# Patient Record
Sex: MALE | Race: WHITE | NOT HISPANIC OR LATINO | Employment: OTHER | URBAN - METROPOLITAN AREA
[De-identification: names, ages, dates, MRNs, and addresses within clinical notes are randomized per-mention and may not be internally consistent; named-entity substitution may affect disease eponyms.]

---

## 2017-06-20 ENCOUNTER — TELEPHONE (OUTPATIENT)
Dept: FAMILY MEDICINE | Facility: CLINIC | Age: 67
End: 2017-06-20

## 2017-06-20 DIAGNOSIS — B00.1 RECURRENT COLD SORES: Primary | ICD-10-CM

## 2017-06-20 RX ORDER — VALACYCLOVIR HYDROCHLORIDE 1 G/1
2000 TABLET, FILM COATED ORAL 2 TIMES DAILY
Qty: 8 TABLET | Refills: 0 | Status: SHIPPED | OUTPATIENT
Start: 2017-06-20 | End: 2017-08-22

## 2017-06-20 NOTE — TELEPHONE ENCOUNTER
Patient has hx of cold sores, getting quiet often. Requesting prescription. Has tried several over the counter treatments with no relief. Had a cold sore a few weeks ago, and has one today.  To provider to advise. .Pearl ARREOLA, RN, CPN

## 2017-06-20 NOTE — TELEPHONE ENCOUNTER
Reason for Call:  Medication or medication refill:    Do you use a [facility]?  Name of the pharmacy and phone number for the current request:  [facility] [phone]    Name of the medication requested: Something for a Core sore    Other request:     Can we leave a detailed message on this number? YES    Phone number patient can be reached at: Home number on file [phone] (home)    Best Time: anytime    Call taken on 6/20/2017 at 10:18 AM by Laurent Musa

## 2017-08-22 ENCOUNTER — TELEPHONE (OUTPATIENT)
Dept: FAMILY MEDICINE | Facility: CLINIC | Age: 67
End: 2017-08-22

## 2017-08-22 DIAGNOSIS — B00.1 RECURRENT COLD SORES: ICD-10-CM

## 2017-08-22 RX ORDER — VALACYCLOVIR HYDROCHLORIDE 1 G/1
2000 TABLET, FILM COATED ORAL 2 TIMES DAILY
Qty: 8 TABLET | Refills: 0 | Status: SHIPPED | OUTPATIENT
Start: 2017-08-22 | End: 2017-09-21

## 2017-08-22 NOTE — TELEPHONE ENCOUNTER
Reason for Call:  Medication or medication refill:    Do you use a [facility]?  Name of the pharmacy and phone number for the current request:  [facility] [phone]    Name of the medication requested: valACYclovir (VALTREX) 1000 mg tablet    Other request:     Can we leave a detailed message on this number? YES    Phone number patient can be reached at: Home number on file [phone] (home)    Best Time:     Call taken on 8/22/2017 at 8:43 AM by Africa Nicholson

## 2017-09-13 ENCOUNTER — DOCUMENTATION ONLY (OUTPATIENT)
Dept: LAB | Facility: CLINIC | Age: 67
End: 2017-09-13

## 2017-09-13 DIAGNOSIS — I10 HYPERTENSION GOAL BP (BLOOD PRESSURE) < 140/90: ICD-10-CM

## 2017-09-13 DIAGNOSIS — E78.5 HYPERLIPIDEMIA WITH TARGET LDL LESS THAN 130: Primary | ICD-10-CM

## 2017-09-13 DIAGNOSIS — Z12.5 SPECIAL SCREENING FOR MALIGNANT NEOPLASM OF PROSTATE: ICD-10-CM

## 2017-09-13 DIAGNOSIS — Z00.00 ROUTINE GENERAL MEDICAL EXAMINATION AT A HEALTH CARE FACILITY: ICD-10-CM

## 2017-09-19 DIAGNOSIS — I10 HYPERTENSION GOAL BP (BLOOD PRESSURE) < 140/90: ICD-10-CM

## 2017-09-19 DIAGNOSIS — Z12.5 SPECIAL SCREENING FOR MALIGNANT NEOPLASM OF PROSTATE: ICD-10-CM

## 2017-09-19 DIAGNOSIS — E78.5 HYPERLIPIDEMIA WITH TARGET LDL LESS THAN 130: ICD-10-CM

## 2017-09-19 DIAGNOSIS — Z00.00 ROUTINE GENERAL MEDICAL EXAMINATION AT A HEALTH CARE FACILITY: ICD-10-CM

## 2017-09-19 LAB
ALBUMIN SERPL-MCNC: 4.1 G/DL (ref 3.4–5)
ALBUMIN UR-MCNC: NEGATIVE MG/DL
ALP SERPL-CCNC: 62 U/L (ref 40–150)
ALT SERPL W P-5'-P-CCNC: 29 U/L (ref 0–70)
ANION GAP SERPL CALCULATED.3IONS-SCNC: 5 MMOL/L (ref 3–14)
APPEARANCE UR: CLEAR
AST SERPL W P-5'-P-CCNC: 15 U/L (ref 0–45)
BILIRUB SERPL-MCNC: 0.7 MG/DL (ref 0.2–1.3)
BILIRUB UR QL STRIP: NEGATIVE
BUN SERPL-MCNC: 13 MG/DL (ref 7–30)
CALCIUM SERPL-MCNC: 9.1 MG/DL (ref 8.5–10.1)
CHLORIDE SERPL-SCNC: 103 MMOL/L (ref 94–109)
CHOLEST SERPL-MCNC: 215 MG/DL
CO2 SERPL-SCNC: 30 MMOL/L (ref 20–32)
COLOR UR AUTO: YELLOW
CREAT SERPL-MCNC: 1.1 MG/DL (ref 0.66–1.25)
GFR SERPL CREATININE-BSD FRML MDRD: 67 ML/MIN/1.7M2
GLUCOSE SERPL-MCNC: 95 MG/DL (ref 70–99)
GLUCOSE UR STRIP-MCNC: NEGATIVE MG/DL
HDLC SERPL-MCNC: 78 MG/DL
HGB UR QL STRIP: ABNORMAL
KETONES UR STRIP-MCNC: NEGATIVE MG/DL
LDLC SERPL CALC-MCNC: 107 MG/DL
LEUKOCYTE ESTERASE UR QL STRIP: NEGATIVE
NITRATE UR QL: NEGATIVE
NONHDLC SERPL-MCNC: 137 MG/DL
PH UR STRIP: 6.5 PH (ref 5–7)
POTASSIUM SERPL-SCNC: 4 MMOL/L (ref 3.4–5.3)
PROT SERPL-MCNC: 7 G/DL (ref 6.8–8.8)
PSA SERPL-ACNC: 1.18 UG/L (ref 0–4)
RBC #/AREA URNS AUTO: NORMAL /HPF
SODIUM SERPL-SCNC: 138 MMOL/L (ref 133–144)
SOURCE: ABNORMAL
SP GR UR STRIP: 1.01 (ref 1–1.03)
TRIGL SERPL-MCNC: 149 MG/DL
UROBILINOGEN UR STRIP-ACNC: 0.2 EU/DL (ref 0.2–1)
WBC #/AREA URNS AUTO: NORMAL /HPF

## 2017-09-19 PROCEDURE — 80053 COMPREHEN METABOLIC PANEL: CPT | Performed by: FAMILY MEDICINE

## 2017-09-19 PROCEDURE — 36415 COLL VENOUS BLD VENIPUNCTURE: CPT | Performed by: FAMILY MEDICINE

## 2017-09-19 PROCEDURE — G0103 PSA SCREENING: HCPCS | Performed by: FAMILY MEDICINE

## 2017-09-19 PROCEDURE — 81001 URINALYSIS AUTO W/SCOPE: CPT | Performed by: FAMILY MEDICINE

## 2017-09-19 PROCEDURE — 80061 LIPID PANEL: CPT | Performed by: FAMILY MEDICINE

## 2017-09-21 ENCOUNTER — OFFICE VISIT (OUTPATIENT)
Dept: FAMILY MEDICINE | Facility: CLINIC | Age: 67
End: 2017-09-21
Payer: COMMERCIAL

## 2017-09-21 VITALS
SYSTOLIC BLOOD PRESSURE: 138 MMHG | BODY MASS INDEX: 27.02 KG/M2 | HEIGHT: 71 IN | DIASTOLIC BLOOD PRESSURE: 78 MMHG | OXYGEN SATURATION: 95 % | WEIGHT: 193 LBS | TEMPERATURE: 97.1 F | HEART RATE: 62 BPM

## 2017-09-21 DIAGNOSIS — Z00.00 ROUTINE GENERAL MEDICAL EXAMINATION AT A HEALTH CARE FACILITY: Primary | ICD-10-CM

## 2017-09-21 DIAGNOSIS — E78.5 HYPERLIPIDEMIA WITH TARGET LDL LESS THAN 130: ICD-10-CM

## 2017-09-21 DIAGNOSIS — B00.1 RECURRENT COLD SORES: ICD-10-CM

## 2017-09-21 DIAGNOSIS — Z12.11 SPECIAL SCREENING FOR MALIGNANT NEOPLASMS, COLON: ICD-10-CM

## 2017-09-21 DIAGNOSIS — Z23 NEED FOR PROPHYLACTIC VACCINATION AND INOCULATION AGAINST INFLUENZA: ICD-10-CM

## 2017-09-21 DIAGNOSIS — I10 ESSENTIAL HYPERTENSION WITH GOAL BLOOD PRESSURE LESS THAN 140/90: ICD-10-CM

## 2017-09-21 PROCEDURE — G0008 ADMIN INFLUENZA VIRUS VAC: HCPCS | Performed by: FAMILY MEDICINE

## 2017-09-21 PROCEDURE — 90662 IIV NO PRSV INCREASED AG IM: CPT | Performed by: FAMILY MEDICINE

## 2017-09-21 PROCEDURE — 99397 PER PM REEVAL EST PAT 65+ YR: CPT | Mod: 25 | Performed by: FAMILY MEDICINE

## 2017-09-21 RX ORDER — SIMVASTATIN 40 MG
40 TABLET ORAL AT BEDTIME
Qty: 90 TABLET | Refills: 3 | Status: SHIPPED | OUTPATIENT
Start: 2017-09-21 | End: 2018-10-11

## 2017-09-21 RX ORDER — LISINOPRIL 20 MG/1
20 TABLET ORAL DAILY
Qty: 90 TABLET | Refills: 3 | Status: SHIPPED | OUTPATIENT
Start: 2017-09-21 | End: 2018-10-11

## 2017-09-21 RX ORDER — VALACYCLOVIR HYDROCHLORIDE 1 G/1
2000 TABLET, FILM COATED ORAL 2 TIMES DAILY
Qty: 24 TABLET | Refills: 1 | Status: SHIPPED | OUTPATIENT
Start: 2017-09-21 | End: 2018-08-31

## 2017-09-21 NOTE — NURSING NOTE
"Chief Complaint   Patient presents with     Physical       Initial /78 (BP Location: Right arm, Patient Position: Sitting, Cuff Size: Adult Large)  Pulse 62  Temp 97.1  F (36.2  C) (Oral)  Ht 5' 10.5\" (1.791 m)  Wt 193 lb (87.5 kg)  SpO2 95%  BMI 27.3 kg/m2 Estimated body mass index is 27.3 kg/(m^2) as calculated from the following:    Height as of this encounter: 5' 10.5\" (1.791 m).    Weight as of this encounter: 193 lb (87.5 kg).  Medication Reconciliation: complete  Mimi Light CMA    "

## 2017-09-21 NOTE — PROGRESS NOTES
SUBJECTIVE:   CC: Harvey Guevara is an 67 year old male who presents for preventative health visit.   Follow-up htn, ed, sinus congestion and high cholesterol.   Due for colonoscopy. No black or bloody stools.   Occasionally outside blood pressure reading. No chest pain or shortness of breath.  2 cups coffee in AM. Water intake good. Beer x2/day prn.   Physical   Annual:     Getting at least 3 servings of Calcium per day::  Yes    Bi-annual eye exam::  Yes    Dental care twice a year::  Yes    Sleep apnea or symptoms of sleep apnea::  None    Diet::  Regular (no restrictions)    Taking medications regularly::  Yes    Medication side effects::  None    Additional concerns today::  YES      PROBLEMS TO ADD ON...    Today's PHQ-2 Score:   PHQ-2 ( 1999 Pfizer) 9/21/2017   Q1: Little interest or pleasure in doing things 0   Q2: Feeling down, depressed or hopeless 0   PHQ-2 Score 0   Q1: Little interest or pleasure in doing things Not at all   Q2: Feeling down, depressed or hopeless Not at all   PHQ-2 Score 0       Abuse: Current or Past(Physical, Sexual or Emotional)- No  Do you feel safe in your environment - Yes    Social History   Substance Use Topics     Smoking status: Former Smoker     Smokeless tobacco: Not on file      Comment: Lives in smoke free household     Alcohol use 0.0 oz/week     0 Standard drinks or equivalent per week     The patient does not drink >3 drinks per day nor >7 drinks per week./ he drinks 1-2 beers a day    Last PSA:   PSA   Date Value Ref Range Status   09/19/2017 1.18 0 - 4 ug/L Final     Comment:     Assay Method:  Chemiluminescence using Siemens Vista analyzer       Reviewed orders with patient. Reviewed health maintenance and updated orders accordingly - Yes  Labs reviewed in EPIC    Reviewed and updated as needed this visit by clinical staff         Reviewed and updated as needed this visit by Provider              ROS:  C: NEGATIVE for fever, chills, change in weight  I: NEGATIVE  "for worrisome rashes, moles or lesions. Dermatitis leg prn cream helpful.  E: NEGATIVE for vision changes or irritation. Yearly exam - ok  ENT: NEGATIVE for ear, mouth and throat problems. flonase prn helpful  R: NEGATIVE for significant cough or SOB  CV: NEGATIVE for chest pain, palpitations or peripheral edema  GI: NEGATIVE for nausea, abdominal pain, heartburn, or change in bowel habits. No black or bloody stools   male: negative for dysuria, hematuria, decreased urinary stream, +erectile dysfunction, urethral discharge  M: NEGATIVE for significant arthralgias or myalgia  N: NEGATIVE for weakness, dizziness or paresthesias  E: NEGATIVE for temperature intolerance, skin/hair changes  H: NEGATIVE for bleeding problems  P: NEGATIVE for changes in mood or affect    OBJECTIVE:   /78 (BP Location: Right arm, Patient Position: Sitting, Cuff Size: Adult Large)  Pulse 62  Temp 97.1  F (36.2  C) (Oral)  Ht 5' 10.5\" (1.791 m)  Wt 193 lb (87.5 kg)  SpO2 95%  BMI 27.3 kg/m2    EXAM:  GENERAL: healthy, alert and no distress  EYES: Eyes grossly normal to inspection, PERRL and conjunctivae and sclerae normal  HENT: ear canals and TM's normal, nose and mouth without ulcers or lesions  NECK: no adenopathy, no asymmetry, masses, or scars and thyroid normal to palpation  RESP: lungs clear to auscultation - no rales, rhonchi or wheezes  BREAST: normal without masses, tenderness or nipple discharge and no palpable axillary masses or adenopathy  CV: regular rate and rhythm, normal S1 S2, no S3 or S4, no murmur, click or rub, no peripheral edema and peripheral pulses strong  ABDOMEN: soft, nontender, no hepatosplenomegaly, no masses and bowel sounds normal   (male): patient deferred /rectal exams. No concerns about masses/pain  MS: no gross musculoskeletal defects noted, no edema  SKIN: no suspicious lesions or rashes  SKIN: no suspicious lesions or rashes except dry skin patch right lateral shin  NEURO: Normal " "strength and tone, mentation intact and speech normal  PSYCH: mentation appears normal, affect normal/bright  LYMPH: no cervical, supraclavicular, axillary, or inguinal adenopathy    ASSESSMENT/PLAN:   ASSESSMENT / PLAN:  (Z00.00) Routine general medical examination at a health care facility  (primary encounter diagnosis)  Comment: generally healthy and normal exam  Plan: Reviewed self mole/testicle check handout.  Call/email with questions/concerns    (I10) Essential hypertension with goal blood pressure less than 140/90  Comment: stable  Plan: lisinopril (PRINIVIL/ZESTRIL) 20 MG tablet        Continue diet/exercise and self-monitor. Reveiwed risks and side effects of medication      (E78.5) Hyperlipidemia with target LDL less than 130  Comment: stable  Plan: simvastatin (ZOCOR) 40 MG tablet        Continue diet/exericse. Reveiwed risks and side effects of medication  Chest pain or shortness of breath to er.     (B00.1) Recurrent cold sores  Plan: valACYclovir (VALTREX) 1000 mg tablet        Reveiwed risks and side effects of medication      (Z23) Need for prophylactic vaccination and inoculation against influenza  Plan: FLU VACCINE, INCREASED ANTIGEN, PRESV FREE, AGE        65+ [33966], Vaccine Administration, Initial         [80494]            (Z12.11) Special screening for malignant neoplasms, colon  Plan: GASTROENTEROLOGY ADULT REF PROCEDURE ONLY          COUNSELING:   Reviewed preventive health counseling, as reflected in patient instructions       Regular exercise       Healthy diet/nutrition       Vision screening       Aspirin ProphylaxsisAlcohol UseOsteoporosis Prevention/Bone Health         Colon cancer screening       Prostate cancer screening              reports that he has quit smoking. He does not have any smokeless tobacco history on file.      Estimated body mass index is 27.92 kg/(m^2) as calculated from the following:    Height as of 9/12/16: 5' 10.25\" (1.784 m).    Weight as of 9/12/16: 196 lb " (88.9 kg).         Counseling Resources:  ATP IV Guidelines  Pooled Cohorts Equation Calculator  FRAX Risk Assessment  ICSI Preventive Guidelines  Dietary Guidelines for Americans, 2010  USDA's MyPlate  ASA Prophylaxis  Lung CA Screening    José Lee MD  [facility] submitted by the patient on 9/21/2017   PHQ-2 Score: 0    Injectable Influenza Immunization Documentation    1.  Is the person to be vaccinated sick today?   No    2. Does the person to be vaccinated have an allergy to a component   of the vaccine?   No    3. Has the person to be vaccinated ever had a serious reaction   to influenza vaccine in the past?   No    4. Has the person to be vaccinated ever had Guillain-Barré syndrome?   No    Form completed by Mimi Light CMA

## 2017-09-21 NOTE — MR AVS SNAPSHOT
After Visit Summary   9/21/2017    Harvey Guevara    MRN: [id number]           Patient Information     Date Of Birth          1950        Visit Information        Provider Department      9/21/2017 7:20 AM José Lee MD [facility]        Today's Diagnoses     Routine general medical examination at a health care facility    -  1    Essential hypertension with goal blood pressure less than 140/90        Hyperlipidemia with target LDL less than 130        Recurrent cold sores        Need for prophylactic vaccination and inoculation against influenza        Special screening for malignant neoplasms, colon           Follow-ups after your visit        Additional Services     GASTROENTEROLOGY ADULT REF PROCEDURE ONLY       Last Lab Result: Creatinine (mg/dL)       Date                     Value                 09/19/2017               1.10             ----------  Body mass index is 27.3 kg/(m^2).      Patient will be contacted to schedule procedure.     Please be aware that coverage of these services is subject to the terms and limitations of your health insurance plan.  Call member services at your health plan with any benefit or coverage questions.  Any procedures must be performed at a [facility] facility OR coordinated by your clinic's referral office.    Please bring the following with you to your appointment:    (1) Any X-Rays, CTs or MRIs which have been performed.  Contact the facility where they were done to arrange for  prior to your scheduled appointment.    (2) List of current medications   (3) This referral request   (4) Any documents/labs given to you for this referral                  Who to contact     If you have questions or need follow up information about today's clinic visit or your schedule please contact [facility] directly at [phone].  Normal or non-critical lab and imaging results will be communicated to you by MyChart, letter or  "phone within 4 business days after the clinic has received the results. If you do not hear from us within 7 days, please contact the clinic through [email or web address] or phone. If you have a critical or abnormal lab result, we will notify you by phone as soon as possible.  Submit refill requests through [email or web address] or call your pharmacy and they will forward the refill request to us. Please allow 3 business days for your refill to be completed.          Additional Information About Your Visit        [email or web address] Information     [email or web address] lets you send messages to your doctor, view your test results, renew your prescriptions, schedule appointments and more. To sign up, go to [email or web address] . Click on \"Log in\" on the left side of the screen, which will take you to the Welcome page. Then click on \"Sign up Now\" on the right side of the page.     You will be asked to enter the access code listed below, as well as some personal information. Please follow the directions to create your username and password.     Your access code is: [email or web address]  Expires: 2017  7:46 AM     Your access code will  in 90 days. If you need help or a new code, please call your [facility] clinic or [phone].        Care EveryWhere ID     This is your Care EveryWhere ID. This could be used by other organizations to access your [facility] medical records  [id number]        Your Vitals Were     Pulse Temperature Height Pulse Oximetry BMI (Body Mass Index)       62 97.1  F (36.2  C) (Oral) 5' 10.5\" (1.791 m) 95% 27.3 kg/m2        Blood Pressure from Last 3 Encounters:   17 138/78   16 130/70   09/03/15 130/70    Weight from Last 3 Encounters:   17 193 lb (87.5 kg)   16 196 lb (88.9 kg)   09/03/15 189 lb (85.7 kg)              We Performed the Following     FLU VACCINE, INCREASED ANTIGEN, PRESV FREE, AGE 65+ [44791]     GASTROENTEROLOGY ADULT REF PROCEDURE ONLY     Vaccine Administration, Initial [28574]          Today's " Medication Changes          These changes are accurate as of: 9/21/17  7:46 AM.  If you have any questions, ask your nurse or doctor.               These medicines have changed or have updated prescriptions.        Dose/Directions    valACYclovir 1000 mg tablet   Commonly known as:  VALTREX   This may have changed:  additional instructions   Used for:  Recurrent cold sores   Changed by:  José Lee MD        Dose:  2000 mg   Take 2 tablets (2,000 mg) by mouth 2 times daily x1 day As needed for cold sores   Quantity:  24 tablet   Refills:  1            Where to get your medicines      These medications were sent to [facility]/pharmacy #[id number] - [place], [place] - [address],  [address] [facility]  [address], , [facility] [address]     Phone:  [phone]     lisinopril 20 MG tablet    simvastatin 40 MG tablet    valACYclovir 1000 mg tablet                Primary Care Provider Office Phone # Fax #    José Lee [phone] [address] [address]        Equal Access to Services     [facility]: Hadii teodora ku hadasho Soomaali, waaxda luqadaha, qaybta kaalmada adeegyada, waxay idiin hayshaunn el jenkins . So [facility] [phone].    ATENCIÓN: Si habla español, tiene a floyd disposición servicios gratuitos de asistencia lingüística. [facility] [phone].    We comply with applicable federal civil rights laws and [place] laws. We do not discriminate on the basis of race, color, national origin, age, disability sex, sexual orientation or gender identity.            Thank you!     Thank you for choosing [facility]  for your care. Our goal is always to provide you with excellent care. Hearing back from our patients is one way we can continue to improve our services. Please take a few minutes to complete the written survey that you may receive in the mail after your visit with us. Thank you!             Your Updated Medication  List - Protect others around you: Learn how to safely use, store and throw away your medicines at www.disposemymeds.org.          This list is accurate as of: 9/21/17  7:46 AM.  Always use your most recent med list.                   Brand Name Dispense Instructions for use Diagnosis    aspirin 81 MG tablet      Take 81 mg by mouth daily        CENTRUM SILVER per tablet      Take 1 tablet by mouth daily        * fluticasone 50 MCG/ACT spray    FLONASE    1 Package    Spray 2 sprays into both nostrils daily as needed for rhinitis or allergies    Sinus congestion       * fluticasone 50 MCG/ACT spray    FLONASE    3 g    Spray 2 sprays into both nostrils daily 3 bottles please Pharmacy ok to hold prescription until due    Sinus congestion       lisinopril 20 MG tablet    PRINIVIL/ZESTRIL    90 tablet    Take 1 tablet (20 mg) by mouth daily For blood pressure Pharmacy ok to hold prescription until due    Essential hypertension with goal blood pressure less than 140/90       simvastatin 40 MG tablet    ZOCOR    90 tablet    Take 1 tablet (40 mg) by mouth At Bedtime For cholesterol Pharmacy ok to hold prescription until due    Hyperlipidemia with target LDL less than 130       tadalafil 5 MG tablet    CIALIS    30 tablet    Take 1 tablet (5 mg) by mouth as needed for erectile dysfunction Never use with nitroglycerin, terazosin or doxazosin.    ED (erectile dysfunction)       triamcinolone 0.5 % cream    KENALOG    90 g    Apply sparingly to affected area twice daily to leg rash up to 10 days as needed    Psoriasis       valACYclovir 1000 mg tablet    VALTREX    24 tablet    Take 2 tablets (2,000 mg) by mouth 2 times daily x1 day As needed for cold sores    Recurrent cold sores       * Notice:  This list has 2 medication(s) that are the same as other medications prescribed for you. Read the directions carefully, and ask your doctor or other care provider to review them with you.

## 2018-03-14 ENCOUNTER — TRANSFERRED RECORDS (OUTPATIENT)
Dept: HEALTH INFORMATION MANAGEMENT | Facility: CLINIC | Age: 68
End: 2018-03-14

## 2018-03-14 ENCOUNTER — TELEPHONE (OUTPATIENT)
Dept: FAMILY MEDICINE | Facility: CLINIC | Age: 68
End: 2018-03-14

## 2018-03-14 NOTE — TELEPHONE ENCOUNTER
Please abstract the following data from this visit with this patient into the appropriate field in Epic:    Colonoscopy done on this date: 3/14/18 (approximately), by this group: [facility], results were Repeat 5 years.

## 2018-06-26 ENCOUNTER — TELEPHONE (OUTPATIENT)
Dept: FAMILY MEDICINE | Facility: CLINIC | Age: 68
End: 2018-06-26

## 2018-06-26 DIAGNOSIS — N52.9 ED (ERECTILE DYSFUNCTION): ICD-10-CM

## 2018-06-26 RX ORDER — TADALAFIL 5 MG/1
5 TABLET ORAL PRN
Qty: 30 TABLET | Refills: 1 | Status: CANCELLED | OUTPATIENT
Start: 2018-06-26

## 2018-06-26 NOTE — TELEPHONE ENCOUNTER
Spouse is calling stating patient would like a call back to discuss a personal medication patient would like to start. Please call to discuss.Thank you.

## 2018-06-26 NOTE — TELEPHONE ENCOUNTER
Received prescription for cialis in past, would like refill at this time    To provider to advise  Pearl YEEN, RN, CPN

## 2018-07-16 ENCOUNTER — OFFICE VISIT (OUTPATIENT)
Dept: FAMILY MEDICINE | Facility: CLINIC | Age: 68
End: 2018-07-16
Payer: COMMERCIAL

## 2018-07-16 ENCOUNTER — TELEPHONE (OUTPATIENT)
Dept: FAMILY MEDICINE | Facility: CLINIC | Age: 68
End: 2018-07-16

## 2018-07-16 VITALS
HEART RATE: 69 BPM | HEIGHT: 71 IN | RESPIRATION RATE: 26 BRPM | TEMPERATURE: 97 F | WEIGHT: 195 LBS | SYSTOLIC BLOOD PRESSURE: 130 MMHG | BODY MASS INDEX: 27.3 KG/M2 | DIASTOLIC BLOOD PRESSURE: 78 MMHG | OXYGEN SATURATION: 97 %

## 2018-07-16 DIAGNOSIS — Z23 NEED FOR VACCINATION: Primary | ICD-10-CM

## 2018-07-16 DIAGNOSIS — N52.8 OTHER MALE ERECTILE DYSFUNCTION: ICD-10-CM

## 2018-07-16 DIAGNOSIS — N52.8 OTHER MALE ERECTILE DYSFUNCTION: Primary | ICD-10-CM

## 2018-07-16 DIAGNOSIS — I10 HYPERTENSION GOAL BP (BLOOD PRESSURE) < 140/90: ICD-10-CM

## 2018-07-16 DIAGNOSIS — E78.5 HYPERLIPIDEMIA WITH TARGET LDL LESS THAN 130: ICD-10-CM

## 2018-07-16 PROCEDURE — G0008 ADMIN INFLUENZA VIRUS VAC: HCPCS | Performed by: FAMILY MEDICINE

## 2018-07-16 PROCEDURE — 99214 OFFICE O/P EST MOD 30 MIN: CPT | Mod: 25 | Performed by: FAMILY MEDICINE

## 2018-07-16 PROCEDURE — G0009 ADMIN PNEUMOCOCCAL VACCINE: HCPCS | Performed by: FAMILY MEDICINE

## 2018-07-16 PROCEDURE — 90732 PPSV23 VACC 2 YRS+ SUBQ/IM: CPT | Performed by: FAMILY MEDICINE

## 2018-07-16 PROCEDURE — 90471 IMMUNIZATION ADMIN: CPT | Performed by: FAMILY MEDICINE

## 2018-07-16 RX ORDER — SILDENAFIL CITRATE 20 MG/1
TABLET ORAL
Qty: 30 TABLET | Refills: 1 | Status: SHIPPED | OUTPATIENT
Start: 2018-07-16 | End: 2019-03-15

## 2018-07-16 RX ORDER — TADALAFIL 20 MG/1
20 TABLET ORAL DAILY PRN
Qty: 20 TABLET | Refills: 2 | Status: SHIPPED | OUTPATIENT
Start: 2018-07-16 | End: 2018-10-11

## 2018-07-16 RX ORDER — VARDENAFIL HYDROCHLORIDE 20 MG/1
10 TABLET ORAL DAILY PRN
Qty: 20 TABLET | Refills: 2 | Status: SHIPPED | OUTPATIENT
Start: 2018-07-16 | End: 2018-10-11

## 2018-07-16 NOTE — MR AVS SNAPSHOT
After Visit Summary   7/16/2018    Harvey Guevara    MRN: [id number]           Patient Information     Date Of Birth          1950        Visit Information        Provider Department      7/16/2018 7:35 AM José Lee MD [facility]        Today's Diagnoses     Need for vaccination    -  1    Hypertension goal BP (blood pressure) < 140/90        Hyperlipidemia with target LDL less than 130        Other male erectile dysfunction           Follow-ups after your visit        Additional Services     UROLOGY ADULT REFERRAL       Your provider has referred you to: [facility]: [facility] [place] [phone]   [email or web address]  [facility]: [facility] [place] [phone]   [email or web address]    Please be aware that coverage of these services is subject to the terms and limitations of your health insurance plan.  Call member services at your health plan with any benefit or coverage questions.      Please bring the following with you to your appointment:    (1) Any X-Rays, CTs or MRIs which have been performed.  Contact the facility where they were done to arrange for  prior to your scheduled appointment.    (2) List of current medications  (3) This referral request   (4) Any documents/labs given to you for this referral                  Who to contact     If you have questions or need follow up information about today's clinic visit or your schedule please contact [facility] directly at [phone].  Normal or non-critical lab and imaging results will be communicated to you by MyChart, letter or phone within 4 business days after the clinic has received the results. If you do not hear from us within 7 days, please contact the clinic through MyChart or phone. If you have a critical or abnormal lab result, we will notify you by phone as soon as  "possible.  Submit refill requests through [employer] or call your pharmacy and they will forward the refill request to us. Please allow 3 business days for your refill to be completed.          Additional Information About Your Visit        Care EveryWhere ID     This is your Care EveryWhere ID. This could be used by other organizations to access your [place] medical records  [id number]        Your Vitals Were     Pulse Temperature Respirations Height Pulse Oximetry BMI (Body Mass Index)    69 97  F (36.1  C) (Oral) 26 5' 10.5\" (1.791 m) 97% 27.58 kg/m2       Blood Pressure from Last 3 Encounters:   07/16/18 130/78   09/21/17 138/78   09/12/16 130/70    Weight from Last 3 Encounters:   07/16/18 195 lb (88.5 kg)   09/21/17 193 lb (87.5 kg)   09/12/16 196 lb (88.9 kg)              We Performed the Following     ADMIN INFLUENZA  (For MEDICARE Patients ONLY) []     ADMIN PNEUMOCOCCAL VACCINE (For MEDICARE Patients ONLY) []     Pneumococcal vaccine 23 valent PPSV23  (Pneumovax) [19724]     TDAP VACCINE (ADACEL) [85958.002]     UROLOGY ADULT REFERRAL          Today's Medication Changes          These changes are accurate as of 7/16/18  8:46 AM.  If you have any questions, ask your nurse or doctor.               Start taking these medicines.        Dose/Directions    vardenafil 20 MG tablet   Commonly known as:  LEVITRA   Used for:  Other male erectile dysfunction   Started by:  José Lee MD        Dose:  10 mg   Take 0.5 tablets (10 mg) by mouth daily as needed Can take whole pill if not helpful 60 min prior to sex. Do not use with nitroglycerin, terazosin or doxazosin.   Quantity:  20 tablet   Refills:  2         These medicines have changed or have updated prescriptions.        Dose/Directions    * tadalafil 5 MG tablet   Commonly known as:  CIALIS   This may have changed:  Another medication with the same name was added. Make sure you understand how and when to take each.   Used for:  ED (erectile " dysfunction)   Changed by:  Jsoé Lee MD        Dose:  5 mg   Take 1 tablet (5 mg) by mouth as needed for erectile dysfunction Never use with nitroglycerin, terazosin or doxazosin.   Quantity:  30 tablet   Refills:  1       * tadalafil 20 MG tablet   Commonly known as:  CIALIS   This may have changed:  You were already taking a medication with the same name, and this prescription was added. Make sure you understand how and when to take each.   Used for:  Other male erectile dysfunction   Changed by:  José Lee MD        Dose:  20 mg   Take 1 tablet (20 mg) by mouth daily as needed prior to sex. Do not use with nitroglycerin, terazosin or doxazosin.   Quantity:  20 tablet   Refills:  2       * Notice:  This list has 2 medication(s) that are the same as other medications prescribed for you. Read the directions carefully, and ask your doctor or other care provider to review them with you.         Where to get your medicines      Some of these will need a paper prescription and others can be bought over the counter.  Ask your nurse if you have questions.     Bring a paper prescription for each of these medications     tadalafil 20 MG tablet    vardenafil 20 MG tablet                Primary Care Provider Office Phone # Fax #    José Lee [phone] [id number] [facility] [address]        Equal Access to Services     DESI RUSSO : Hadii teodora martínez hadasho Soomaali, waaxda luqadaha, qaybta kaalmada adeegyada, waxtyree mares. So [facility] [phone].    ATENCIÓN: Si habla español, tiene a floyd disposición servicios gratuitos de asistencia lingüística. Llame al [phone].    We comply with applicable federal civil rights laws and [place] laws. We do not discriminate on the basis of race, color, national origin, age, disability, sex, sexual orientation, or gender identity.            Thank you!     Thank you for choosing [facility]   for your care. Our goal is always to provide you with excellent care. Hearing back from our patients is one way we can continue to improve our services. Please take a few minutes to complete the written survey that you may receive in the mail after your visit with us. Thank you!             Your Updated Medication List - Protect others around you: Learn how to safely use, store and throw away your medicines at www.disposemymeds.org.          This list is accurate as of 7/16/18  8:46 AM.  Always use your most recent med list.                   Brand Name Dispense Instructions for use Diagnosis    aspirin 81 MG tablet      Take 81 mg by mouth daily        CENTRUM SILVER per tablet      Take 1 tablet by mouth daily        fluticasone 50 MCG/ACT spray    FLONASE    1 Package    Spray 2 sprays into both nostrils daily as needed for rhinitis or allergies    Sinus congestion       lisinopril 20 MG tablet    PRINIVIL/ZESTRIL    90 tablet    Take 1 tablet (20 mg) by mouth daily For blood pressure Pharmacy ok to hold prescription until due    Essential hypertension with goal blood pressure less than 140/90       simvastatin 40 MG tablet    ZOCOR    90 tablet    Take 1 tablet (40 mg) by mouth At Bedtime For cholesterol Pharmacy ok to hold prescription until due    Hyperlipidemia with target LDL less than 130       * tadalafil 5 MG tablet    CIALIS    30 tablet    Take 1 tablet (5 mg) by mouth as needed for erectile dysfunction Never use with nitroglycerin, terazosin or doxazosin.    ED (erectile dysfunction)       * tadalafil 20 MG tablet    CIALIS    20 tablet    Take 1 tablet (20 mg) by mouth daily as needed prior to sex. Do not use with nitroglycerin, terazosin or doxazosin.    Other male erectile dysfunction       triamcinolone 0.5 % cream    KENALOG    90 g    Apply sparingly to affected area twice daily to leg rash up to 10 days as needed    Psoriasis       valACYclovir 1000 mg tablet    VALTREX    24 tablet    Take 2  tablets (2,000 mg) by mouth 2 times daily x1 day As needed for cold sores    Recurrent cold sores       vardenafil 20 MG tablet    LEVITRA    20 tablet    Take 0.5 tablets (10 mg) by mouth daily as needed Can take whole pill if not helpful 60 min prior to sex. Do not use with nitroglycerin, terazosin or doxazosin.    Other male erectile dysfunction       * Notice:  This list has 2 medication(s) that are the same as other medications prescribed for you. Read the directions carefully, and ask your doctor or other care provider to review them with you.

## 2018-07-16 NOTE — TELEPHONE ENCOUNTER
Patient is asking for an RX for Sildenafil 20mg.  The Levitra is too expensive.  Please bring rx to pharmacy.    Thank You  Alisha Oneil, [facility]  [phone]

## 2018-07-16 NOTE — PROGRESS NOTES
"SUBJECTIVE:  Harvey Gueavra, a 68 year old male scheduled an appointment to discuss the following issues:  Need for vaccination  Follow-up htn, high cholesterol and ED.   Getting viagra 100mg.  Drive still ok. Outside blood pressure running ok. Well hydrated.   No chest pain or shortness of breath. Emotionally doing. Great exercise tolerance.   No nausea, vomiting or diarrhea or black or bloody stools. No urine changes or hematuria. Nocturia. Stream ok.     Past Medical History:   Diagnosis Date     Adenomatous colon polyp 2008     Benign prostatic hypertrophy      Eczema      Hypercholesterolemia      Hypertension      Inguinal hernia      Sinus bradycardia        Past Surgical History:   Procedure Laterality Date     COLONOSCOPY  2011    polyp-repeat 5 years     HERNIA REPAIR       hisory of colonoscopy  03.2011    repeat in 5 years       Family History   Problem Relation Age of Onset     Cancer Brother      skin cancer     Cancer Sister      skin cancer     Diabetes No family hx of      Hypertension No family hx of      Cerebrovascular Disease No family hx of      Thyroid Disease No family hx of      Glaucoma No family hx of      Macular Degeneration No family hx of        Social History   Substance Use Topics     Smoking status: Former Smoker     Smokeless tobacco: Never Used      Comment: Lives in smoke free household     Alcohol use 0.0 oz/week     0 Standard drinks or equivalent per week      Comment: 1-2 beers at night        ROS:  All other ROS negative.     OBJECTIVE:  /78  Pulse 69  Temp 97  F (36.1  C) (Oral)  Resp 26  Ht 5' 10.5\" (1.791 m)  Wt 195 lb (88.5 kg)  SpO2 97%  BMI 27.58 kg/m2  EXAM:  GENERAL APPEARANCE: healthy, alert and no distress  EYES: EOMI,  PERRL  NECK: no adenopathy, no asymmetry, masses, or scars and thyroid normal to palpation  RESP: lungs clear to auscultation - no rales, rhonchi or wheezes  CV: regular rates and rhythm, normal S1 S2, no S3 or S4 and no murmur, " click or rub -  ABDOMEN:  soft, nontender, no HSM or masses and bowel sounds normal  MS: extremities normal- no gross deformities noted, no evidence of inflammation in joints, FROM in all extremities.  PSYCH: mentation appears normal and affect normal/bright    ASSESSMENT / PLAN:  (Z23) Need for vaccination  (primary encounter diagnosis)  Plan: TDAP VACCINE (ADACEL) [29132.002], Pneumococcal        vaccine 23 valent PPSV23  (Pneumovax) [06973],         ADMIN INFLUENZA  (For MEDICARE Patients ONLY)         [], ADMIN PNEUMOCOCCAL VACCINE (For         MEDICARE Patients ONLY) []            (I10) Hypertension goal BP (blood pressure) < 140/90  Comment: stable  Plan: Recheck in 3 months  For fasting labs/etc. Keep well hydrated especially with heat. Chest pain or shortness of breath to er.       (N52.8) Other male erectile dysfunction  Comment: needs help  Plan: UROLOGY ADULT REFERRAL, vardenafil (LEVITRA) 20        MG tablet, tadalafil (CIALIS) 20 MG tablet        Urology if ed meds not helpful or worse nocturia/stream/etc. psa in fall. Call/email with questions/concerns. Expected course and warning signs reviewed.   Patient will try cialis first - had in past.   José Lee

## 2018-07-16 NOTE — NURSING NOTE
"Screening Questionnaire for Adult Immunization    Are you sick today?   No   Do you have allergies to medications, food, a vaccine component or latex?   No   Have you ever had a serious reaction after receiving a vaccination?   No   Do you have a long-term health problem with heart disease, lung disease, asthma, kidney disease, metabolic disease (e.g. diabetes), anemia, or other blood disorder?   No   Do you have cancer, leukemia, HIV/AIDS, or any other immune system problem?   No   In the past 3 months, have you taken medications that affect  your immune system, such as prednisone, other steroids, or anticancer drugs; drugs for the treatment of rheumatoid arthritis, Crohn s disease, or psoriasis; or have you had radiation treatments?   No   Have you had a seizure, or a brain or other nervous system problem?   No   During the past year, have you received a transfusion of blood or blood     products, or been given immune (gamma) globulin or antiviral drug?   No   For women: Are you pregnant or is there a chance you could become        pregnant during the next month?   No   Have you received any vaccinations in the past 4 weeks?   No     Immunization questionnaire answers were all negative.        Per orders of Dr. Lee, injection of boosrix and pneu 23  given by Mimi Light. Patient instructed to remain in clinic for 15 minutes afterwards, and to report any adverse reaction to me immediately.       Screening performed by Mimi Light on 7/16/2018 at 7:42 AM.  Chief Complaint   Patient presents with     Erectile Dysfunction     Health Maintenance       Initial /78  Pulse 69  Temp 97  F (36.1  C) (Oral)  Resp 26  Ht 5' 10.5\" (1.791 m)  Wt 195 lb (88.5 kg)  SpO2 97%  BMI 27.58 kg/m2 Estimated body mass index is 27.58 kg/(m^2) as calculated from the following:    Height as of this encounter: 5' 10.5\" (1.791 m).    Weight as of this encounter: 195 lb (88.5 kg).    Mimi Light, [facility]    "

## 2018-07-16 NOTE — TELEPHONE ENCOUNTER
Harvey got a tetanus and pneumonia shot while in clinic today.  Thought he already got a pneumonia shot?  He shouldn't have needed another one?  Please call to advise.     I called Harvey. It is recommend to get a pneumonia shot Booster.. I left him a message.    Mimi Light, [facility]

## 2018-08-31 DIAGNOSIS — B00.1 RECURRENT COLD SORES: ICD-10-CM

## 2018-08-31 RX ORDER — VALACYCLOVIR HYDROCHLORIDE 1 G/1
TABLET, FILM COATED ORAL
Qty: 24 TABLET | Refills: 1 | Status: SHIPPED | OUTPATIENT
Start: 2018-08-31 | End: 2019-09-09

## 2018-09-27 ENCOUNTER — DOCUMENTATION ONLY (OUTPATIENT)
Dept: LAB | Facility: CLINIC | Age: 68
End: 2018-09-27

## 2018-09-27 DIAGNOSIS — Z13.6 CARDIOVASCULAR SCREENING; LDL GOAL LESS THAN 160: Primary | ICD-10-CM

## 2018-09-27 DIAGNOSIS — I10 HYPERTENSION GOAL BP (BLOOD PRESSURE) < 140/90: ICD-10-CM

## 2018-09-27 DIAGNOSIS — Z00.00 ROUTINE GENERAL MEDICAL EXAMINATION AT A HEALTH CARE FACILITY: ICD-10-CM

## 2018-09-27 DIAGNOSIS — E78.5 HYPERLIPIDEMIA WITH TARGET LDL LESS THAN 130: ICD-10-CM

## 2018-09-27 DIAGNOSIS — Z12.5 SPECIAL SCREENING FOR MALIGNANT NEOPLASM OF PROSTATE: ICD-10-CM

## 2018-09-27 NOTE — PROGRESS NOTES
Patient has an upcoming previsit appointment on 10/04/2018. Please review pended orders and add additional orders if needed.     Thank you,   Debra Le

## 2018-09-28 NOTE — PROGRESS NOTES
Need previsit labs-see orders and close encounter if nothing else needed./Mariana Matias,       Physical 10/11/18

## 2018-10-04 DIAGNOSIS — Z12.5 SPECIAL SCREENING FOR MALIGNANT NEOPLASM OF PROSTATE: ICD-10-CM

## 2018-10-04 DIAGNOSIS — E78.5 HYPERLIPIDEMIA WITH TARGET LDL LESS THAN 130: ICD-10-CM

## 2018-10-04 DIAGNOSIS — I10 HYPERTENSION GOAL BP (BLOOD PRESSURE) < 140/90: ICD-10-CM

## 2018-10-04 DIAGNOSIS — Z00.00 ROUTINE GENERAL MEDICAL EXAMINATION AT A HEALTH CARE FACILITY: ICD-10-CM

## 2018-10-04 LAB
ALBUMIN SERPL-MCNC: 4 G/DL (ref 3.4–5)
ALBUMIN UR-MCNC: NEGATIVE MG/DL
ALP SERPL-CCNC: 64 U/L (ref 40–150)
ALT SERPL W P-5'-P-CCNC: 28 U/L (ref 0–70)
ANION GAP SERPL CALCULATED.3IONS-SCNC: 7 MMOL/L (ref 3–14)
APPEARANCE UR: CLEAR
AST SERPL W P-5'-P-CCNC: 22 U/L (ref 0–45)
BILIRUB SERPL-MCNC: 0.6 MG/DL (ref 0.2–1.3)
BILIRUB UR QL STRIP: NEGATIVE
BUN SERPL-MCNC: 12 MG/DL (ref 7–30)
CALCIUM SERPL-MCNC: 9.3 MG/DL (ref 8.5–10.1)
CHLORIDE SERPL-SCNC: 104 MMOL/L (ref 94–109)
CHOLEST SERPL-MCNC: 202 MG/DL
CO2 SERPL-SCNC: 28 MMOL/L (ref 20–32)
COLOR UR AUTO: YELLOW
CREAT SERPL-MCNC: 0.98 MG/DL (ref 0.66–1.25)
ERYTHROCYTE [DISTWIDTH] IN BLOOD BY AUTOMATED COUNT: 13.1 % (ref 10–15)
GFR SERPL CREATININE-BSD FRML MDRD: 76 ML/MIN/1.7M2
GLUCOSE SERPL-MCNC: 94 MG/DL (ref 70–99)
GLUCOSE UR STRIP-MCNC: NEGATIVE MG/DL
HCT VFR BLD AUTO: 45.3 % (ref 40–53)
HDLC SERPL-MCNC: 80 MG/DL
HGB BLD-MCNC: 15.4 G/DL (ref 13.3–17.7)
HGB UR QL STRIP: NEGATIVE
KETONES UR STRIP-MCNC: NEGATIVE MG/DL
LDLC SERPL CALC-MCNC: 104 MG/DL
LEUKOCYTE ESTERASE UR QL STRIP: NEGATIVE
MCH RBC QN AUTO: 31.1 PG (ref 26.5–33)
MCHC RBC AUTO-ENTMCNC: 34 G/DL (ref 31.5–36.5)
MCV RBC AUTO: 92 FL (ref 78–100)
NITRATE UR QL: NEGATIVE
NONHDLC SERPL-MCNC: 122 MG/DL
PH UR STRIP: 7 PH (ref 5–7)
PLATELET # BLD AUTO: 229 10E9/L (ref 150–450)
POTASSIUM SERPL-SCNC: 4.3 MMOL/L (ref 3.4–5.3)
PROT SERPL-MCNC: 7.4 G/DL (ref 6.8–8.8)
PSA SERPL-ACNC: 1.12 UG/L (ref 0–4)
RBC # BLD AUTO: 4.95 10E12/L (ref 4.4–5.9)
SODIUM SERPL-SCNC: 139 MMOL/L (ref 133–144)
SOURCE: NORMAL
SP GR UR STRIP: 1.01 (ref 1–1.03)
TRIGL SERPL-MCNC: 89 MG/DL
UROBILINOGEN UR STRIP-ACNC: 0.2 EU/DL (ref 0.2–1)
WBC # BLD AUTO: 7.5 10E9/L (ref 4–11)

## 2018-10-04 PROCEDURE — 81003 URINALYSIS AUTO W/O SCOPE: CPT | Performed by: FAMILY MEDICINE

## 2018-10-04 PROCEDURE — 80053 COMPREHEN METABOLIC PANEL: CPT | Performed by: FAMILY MEDICINE

## 2018-10-04 PROCEDURE — 80061 LIPID PANEL: CPT | Performed by: FAMILY MEDICINE

## 2018-10-04 PROCEDURE — 85027 COMPLETE CBC AUTOMATED: CPT | Performed by: FAMILY MEDICINE

## 2018-10-04 PROCEDURE — G0103 PSA SCREENING: HCPCS | Performed by: FAMILY MEDICINE

## 2018-10-04 PROCEDURE — 36415 COLL VENOUS BLD VENIPUNCTURE: CPT | Performed by: FAMILY MEDICINE

## 2018-10-11 ENCOUNTER — OFFICE VISIT (OUTPATIENT)
Dept: FAMILY MEDICINE | Facility: CLINIC | Age: 68
End: 2018-10-11
Payer: COMMERCIAL

## 2018-10-11 VITALS
HEIGHT: 71 IN | DIASTOLIC BLOOD PRESSURE: 73 MMHG | HEART RATE: 73 BPM | SYSTOLIC BLOOD PRESSURE: 143 MMHG | TEMPERATURE: 97.1 F | OXYGEN SATURATION: 97 % | WEIGHT: 203 LBS | RESPIRATION RATE: 20 BRPM | BODY MASS INDEX: 28.42 KG/M2

## 2018-10-11 DIAGNOSIS — E78.5 HYPERLIPIDEMIA WITH TARGET LDL LESS THAN 130: ICD-10-CM

## 2018-10-11 DIAGNOSIS — Z00.00 MEDICARE ANNUAL WELLNESS VISIT, SUBSEQUENT: Primary | ICD-10-CM

## 2018-10-11 DIAGNOSIS — I10 HYPERTENSION GOAL BP (BLOOD PRESSURE) < 150/90: ICD-10-CM

## 2018-10-11 DIAGNOSIS — Z87.891 HISTORY OF TOBACCO USE: ICD-10-CM

## 2018-10-11 DIAGNOSIS — Z13.6 SCREENING FOR AAA (ABDOMINAL AORTIC ANEURYSM): ICD-10-CM

## 2018-10-11 DIAGNOSIS — Z23 NEED FOR PROPHYLACTIC VACCINATION AND INOCULATION AGAINST INFLUENZA: ICD-10-CM

## 2018-10-11 PROCEDURE — 90662 IIV NO PRSV INCREASED AG IM: CPT | Performed by: FAMILY MEDICINE

## 2018-10-11 PROCEDURE — G0008 ADMIN INFLUENZA VIRUS VAC: HCPCS | Performed by: FAMILY MEDICINE

## 2018-10-11 PROCEDURE — G0439 PPPS, SUBSEQ VISIT: HCPCS | Performed by: FAMILY MEDICINE

## 2018-10-11 RX ORDER — SIMVASTATIN 40 MG
40 TABLET ORAL AT BEDTIME
Qty: 90 TABLET | Refills: 3 | Status: SHIPPED | OUTPATIENT
Start: 2018-10-11 | End: 2019-12-18

## 2018-10-11 RX ORDER — LISINOPRIL 20 MG/1
20 TABLET ORAL DAILY
Qty: 90 TABLET | Refills: 3 | Status: SHIPPED | OUTPATIENT
Start: 2018-10-11 | End: 2019-12-18

## 2018-10-11 NOTE — PROGRESS NOTES
SUBJECTIVE:   Harvey Guevara is a 68 year old male who presents for Preventive Visit.  Follow-up htn, high cholesterol and ED. Active/lots of exercise. A little weight gain.  Outside blood pressure machine - ok.   No chest pain or shortness of breath  Are you in the first 12 months of your Medicare Part B coverage?  No    Ability to successfully perform activities of daily living: Yes, no assistance needed    Home safety:  none identified     Hearing impairment: No    Fall risk:     COGNITIVE SCREEN  1) Repeat 3 items (Leader, Season, Table)    2) Clock draw: NORMAL  3) 3 item recall: Recalls 3 objects  Results: 3 items recalled: COGNITIVE IMPAIRMENT LESS LIKELY    Mini-CogTM Copyright [employer]. Licensed by the author for use in [facility]; reprinted with permission (soob@[facility]). All rights reserved.    PROBLEMS TO ADD ON...  Reviewed and updated as needed this visit by clinical staff       Reviewed and updated as needed this visit by Provider        Social History   Substance Use Topics     Smoking status: Former Smoker     Smokeless tobacco: Never Used      Comment: Lives in smoke free household     Alcohol use 0.0 oz/week     0 Standard drinks or equivalent per week      Comment: 1-2 beers at night        If you drink alcohol do you typically have >3 drinks per day or >7 drinks per week? No                        Today's PHQ-2 Score:   PHQ-2 ( 1999 Pfizer) 10/11/2018 9/21/2017   Q1: Little interest or pleasure in doing things - 0   Q2: Feeling down, depressed or hopeless - 0   PHQ-2 Score - 0   Q1: Little interest or pleasure in doing things - Not at all   Q2: Feeling down, depressed or hopeless - Not at all   PHQ-2 Score Incomplete 0     Do you feel safe in your environment - Yes  Do you have a Health Care Directive?: Yes: Patient states has Advance Directive and will bring in a copy to clinic.    Current providers sharing in care for this patient include:   Patient Care Team:  José Lee  "MD Fritz as PCP - General (Family Practice)    The following health maintenance items are reviewed in Epic and correct as of today:  Health Maintenance   Topic Date Due     AORTIC ANEURYSM SCREENING (SYSTEM ASSIGNED)  06/21/2014     EYE EXAM Q1 YEAR  12/15/2017     INFLUENZA VACCINE (1) 09/01/2018     PHQ-2 Q1 YR  09/21/2018     FALL RISK ASSESSMENT  09/21/2018     ADVANCE DIRECTIVE PLANNING Q5 YRS  07/22/2019     LIPID SCREEN Q5 YR MALE (SYSTEM ASSIGNED)  10/04/2023     COLON CANCER SCREEN (SYSTEM ASSIGNED)  03/14/2028     TETANUS IMMUNIZATION (SYSTEM ASSIGNED)  07/16/2028     PNEUMOCOCCAL  Completed     HEPATITIS C SCREENING  Completed     Labs reviewed in EPIC    ROS:  CONSTITUTIONAL: NEGATIVE for fever, chills, change in weight  INTEGUMENTARY/SKIN: NEGATIVE for worrisome rashes, moles or lesions  ENT/MOUTH: NEGATIVE for ear, mouth and throat problems, no major snoring.   RESP: NEGATIVE for significant cough or SOB  CV: NEGATIVE for chest pain, palpitations or peripheral edema  GI: NEGATIVE for nausea, abdominal pain, heartburn, or change in bowel habits. No black or bloody stools.   : negative for dysuria, hematuria, decreased urinary stream, erectile dysfunction  MUSCULOSKELETAL: NEGATIVE for significant arthralgias or myalgia  NEURO: NEGATIVE for weakness, dizziness or paresthesias  ENDOCRINE: NEGATIVE for temperature intolerance, skin/hair changes  HEME/ALLERGY/IMMUNE: NEGATIVE for bleeding problems  PSYCHIATRIC: NEGATIVE for changes in mood or affect   and 2 children and 2 grandchildren - all ok.     OBJECTIVE:   There were no vitals taken for this visit. Estimated body mass index is 27.58 kg/(m^2) as calculated from the following:    Height as of 7/16/18: 5' 10.5\" (1.791 m).    Weight as of 7/16/18: 195 lb (88.5 kg).     EXAM:   GENERAL: healthy, alert and no distress  EYES: Eyes grossly normal to inspection, PERRL and conjunctivae and sclerae normal  HENT: ear canals and TM's normal, nose and " mouth without ulcers or lesions  NECK: no adenopathy, no asymmetry, masses, or scars and thyroid normal to palpation  RESP: lungs clear to auscultation - no rales, rhonchi or wheezes  BREAST: normal without masses, tenderness or nipple discharge and no palpable axillary masses or adenopathy  CV: regular rate and rhythm, normal S1 S2, no S3 or S4, no murmur, click or rub, no peripheral edema and peripheral pulses strong  ABDOMEN: soft, nontender, no hepatosplenomegaly, no masses and bowel sounds normal   (male): patient deferred /rectal exams. Denies pain/masses/hernia  MS: no gross musculoskeletal defects noted, no edema  SKIN: no suspicious lesions or rashes  NEURO: Normal strength and tone, mentation intact and speech normal  BACK: no CVA tenderness, no paralumbar tenderness  PSYCH: mentation appears normal, affect normal/bright  LYMPH: no cervical, supraclavicular, axillary, or inguinal adenopathy      ASSESSMENT / PLAN:   ASSESSMENT / PLAN:  (Z00.00) Medicare annual wellness visit, subsequent  (primary encounter diagnosis)  Comment: generally healthy and normal exam/labs  Plan: Reviewed self mole/testicle check handout.      (Z13.6) Screening for AAA (abdominal aortic aneurysm)  Plan: Abdominal Aortic Aneurysm Screening/Tracking        X-smoker.     (Z23) Need for prophylactic vaccination and inoculation against influenza  Plan: FLU VACCINE, INCREASED ANTIGEN, PRESV FREE, AGE        65+ [50180], ADMIN INFLUENZA (For MEDICARE         Patients ONLY) []            (I10) Essential hypertension with goal blood pressure less than 150/90  Comment: a little high  Plan: Abdominal Aortic Aneurysm Screening/Tracking,         lisinopril (PRINIVIL/ZESTRIL) 20 MG tablet        Patient would self-monitor. Return to clinic if worse. Chest pain or shortness of breath to er. Limit sodium.     (E78.5) Hyperlipidemia with target LDL less than 130  Comment: stable  Plan: Abdominal Aortic Aneurysm Screening/Tracking,          "simvastatin (ZOCOR) 40 MG tablet        Reveiwed risks and side effects of medication      (Z87.891) History of tobacco use  Plan: Abdominal Aortic Aneurysm Screening/Tracking                End of Life Planning:  Patient currently has an advanced directive: Yes.  Practitioner is supportive of decision.    COUNSELING:  Reviewed preventive health counseling, as reflected in patient instructions       Regular exercise       Healthy diet/nutrition       Vision screening       Hearing screening       Dental care       Colon cancer screening       Prostate cancer screening       Osteoporosis Prevention/Bone Health    BP Readings from Last 1 Encounters:   07/16/18 130/78     Estimated body mass index is 27.58 kg/(m^2) as calculated from the following:    Height as of 7/16/18: 5' 10.5\" (1.791 m).    Weight as of 7/16/18: 195 lb (88.5 kg).           reports that he has quit smoking. He has never used smokeless tobacco.      Appropriate preventive services were discussed with this patient, including applicable screening as appropriate for cardiovascular disease, diabetes, osteopenia/osteoporosis, and glaucoma.  As appropriate for age/gender, discussed screening for colorectal cancer, prostate cancer, breast cancer, and cervical cancer. Checklist reviewing preventive services available has been given to the patient.    Reviewed patients plan of care and provided an AVS. The Intermediate Care Plan ( asthma action plan, low back pain action plan, and migraine action plan) for Harvey meets the Care Plan requirement. This Care Plan has been established and reviewed with the Patient.    Counseling Resources:  ATP IV Guidelines  Pooled Cohorts Equation Calculator  Breast Cancer Risk Calculator  FRAX Risk Assessment  ICSI Preventive Guidelines  Dietary Guidelines for Americans, 2010  playnik's MyPlate  ASA Prophylaxis  Lung CA Screening    José Lee MD  [facility] ANDOVERAnswers for HPI/ROS submitted by the patient on " 10/11/2018   Annual Exam:  Getting at least 3 servings of Calcium per day:: Yes  Bi-annual eye exam:: NO  Dental care twice a year:: Yes  Sleep apnea or symptoms of sleep apnea:: None  Diet:: Regular (no restrictions)  Frequency of exercise:: 4-5 days/week  PHQ-2 Score: Incomplete      Injectable Influenza Immunization Documentation        1.  Is the person to be vaccinated sick today?   No    2. Does the person to be vaccinated have an allergy to a component   of the vaccine?   No  Egg Allergy Algorithm Link    3. Has the person to be vaccinated ever had a serious reaction   to influenza vaccine in the past?   No    4. Has the person to be vaccinated ever had Guillain-Barré syndrome?   No    Form completed by Mimi Light CMA

## 2018-10-11 NOTE — NURSING NOTE
"Chief Complaint   Patient presents with     Medicare Visit     Flu Shot       Initial /82  Pulse 73  Temp 97.1  F (36.2  C) (Oral)  Resp 20  Ht 5' 10.5\" (1.791 m)  Wt 203 lb (92.1 kg)  SpO2 97%  BMI 28.72 kg/m2 Estimated body mass index is 28.72 kg/(m^2) as calculated from the following:    Height as of this encounter: 5' 10.5\" (1.791 m).    Weight as of this encounter: 203 lb (92.1 kg).    Mimi Light, [facility]    "

## 2019-03-07 ENCOUNTER — OFFICE VISIT (OUTPATIENT)
Dept: OPTOMETRY | Facility: CLINIC | Age: 69
End: 2019-03-07
Payer: COMMERCIAL

## 2019-03-07 DIAGNOSIS — H52.223 REGULAR ASTIGMATISM OF BOTH EYES: Primary | ICD-10-CM

## 2019-03-07 DIAGNOSIS — H52.4 PRESBYOPIA: ICD-10-CM

## 2019-03-07 PROCEDURE — 92004 COMPRE OPH EXAM NEW PT 1/>: CPT | Performed by: OPTOMETRIST

## 2019-03-07 PROCEDURE — 92015 DETERMINE REFRACTIVE STATE: CPT | Performed by: OPTOMETRIST

## 2019-03-07 ASSESSMENT — SLIT LAMP EXAM - LIDS: COMMENTS: 1+ LID THICKENING

## 2019-03-07 ASSESSMENT — REFRACTION_MANIFEST
OS_ADD: +2.50
OD_SPHERE: PLANO
METHOD_AUTOREFRACTION: 1
OS_AXIS: 157
OS_SPHERE: PLANO
OD_SPHERE: -0.50
OD_CYLINDER: +1.25
OD_CYLINDER: +1.00
OD_AXIS: 175
OS_CYLINDER: +0.75
OS_SPHERE: +0.25
OS_CYLINDER: +1.00
OD_AXIS: 180
OD_ADD: +2.50
OS_AXIS: 175

## 2019-03-07 ASSESSMENT — VISUAL ACUITY
METHOD: SNELLEN - LINEAR
OD_CC: 20/20
CORRECTION_TYPE: GLASSES
OS_SC+: -1
OS_CC: 20/20-2
OD_SC: 20/30
OS_SC: 20/30
OD_SC+: -2

## 2019-03-07 ASSESSMENT — TONOMETRY
OS_IOP_MMHG: 15
OD_IOP_MMHG: 15
IOP_METHOD: APPLANATION

## 2019-03-07 ASSESSMENT — CONF VISUAL FIELD
METHOD: COUNTING FINGERS
OD_NORMAL: 1
OS_NORMAL: 1

## 2019-03-07 ASSESSMENT — CUP TO DISC RATIO
OD_RATIO: 0.3
OS_RATIO: 0.3

## 2019-03-07 ASSESSMENT — KERATOMETRY
OS_AXISANGLE2_DEGREES: 155
OD_K2POWER_DIOPTERS: 43.00
OS_K1POWER_DIOPTERS: 42.25
OS_K2POWER_DIOPTERS: 42.00
OD_AXISANGLE2_DEGREES: 6
OD_K1POWER_DIOPTERS: 42.25

## 2019-03-07 ASSESSMENT — REFRACTION_WEARINGRX
SPECS_TYPE: OTC'S
OD_SPHERE: +2.50
OS_SPHERE: +2.50

## 2019-03-07 ASSESSMENT — EXTERNAL EXAM - RIGHT EYE: OD_EXAM: NORMAL

## 2019-03-07 ASSESSMENT — EXTERNAL EXAM - LEFT EYE: OS_EXAM: NORMAL

## 2019-03-07 NOTE — PROGRESS NOTES
Chief Complaint   Patient presents with     Annual Eye Exam         Last Eye Exam: 12/15/16  Dilated Previously: Yes    What are you currently using to see?  Readers, uses to read and has +1.50's that he uses on the computer        Distance Vision Acuity: Satisfied with , no changes     Near Vision Acuity: Satisfied with vision while reading  with readers and Satisfied with vision while using computer with readers    Eye Comfort: good  Do you use eye drops? : Yes: As needed, on occasion uses Opcon A  Occupation or Hobbies: Retired     [employer] Assistant           Medical, surgical and family histories reviewed and updated 3/7/2019.       OBJECTIVE: See Ophthalmology exam    ASSESSMENT:    ICD-10-CM    1. Regular astigmatism of both eyes H52.223 EYE EXAM (SIMPLE-NONBILLABLE)     REFRACTION   2. Presbyopia H52.4 EYE EXAM (SIMPLE-NONBILLABLE)     REFRACTION      PLAN:     Patient Instructions   Patient was advised of today's exam findings.  Reading glasses advised  Continue allergy drops as needed  Return in 1 year for eye exam    Chery Olson O.D.  [facility]   [address] [place] [address] [phone]

## 2019-03-07 NOTE — PATIENT INSTRUCTIONS
Patient was advised of today's exam findings.  Reading glasses advised  Continue allergy drops as needed  Return in 1 year for eye exam    Chery Olson O.D.  [facility]   [address] [place] [address] [phone]

## 2019-03-12 ENCOUNTER — PATIENT OUTREACH (OUTPATIENT)
Dept: CARE COORDINATION | Facility: CLINIC | Age: 69
End: 2019-03-12

## 2019-03-12 NOTE — LETTER
[place] CARE COORDINATION  [facility]  [address] [place] [address]  [phone]       March 13, 2019    Harvey Guevara  [address] [facility] [address]      Dear Harvey,    I am a clinic care coordinator who works with José Lee MD at [facility]. I have been trying to reach you recently to introduce Clinic Care Coordination and to see if there was anything I could assist you with.  I wanted to introduce myself and provide you with my contact information so that you can call me with questions or concerns about your health care. Below is a description of clinic care coordination and how I can further assist you.     The clinic care coordinator is a registered nurse and/or  who understand the health care system. The goal of clinic care coordination is to help you manage your health and improve access to the [facility] system in the most efficient manner. The registered nurse can assist you in meeting your health care goals by providing education, coordinating services, and strengthening the communication among your providers. The  can assist you with financial, behavioral, psychosocial, chemical dependency, counseling, and/or psychiatric resources.    Please feel free to contact me at [phone], with any questions or concerns. We at [facility] are focused on providing you with the highest-quality healthcare experience possible and that all starts with you.     Sincerely,     Elham Chowdhury    Enclosed: I have enclosed a copy of a 24 Hour Access Plan. This has helpful phone numbers for you to call when needed. Please keep this in an easy to access place to use as needed.

## 2019-03-12 NOTE — LETTER
Health Care Home - Access Care Plan    About Me  Patient Name:  Harvey Guevara    YOB: 1950  Age:                             68 year old   Sarah MRN:            [id number] Telephone Information:   Home Phone [phone]   Mobile [phone]       Address:    [address] [facility] [address] Email address:  No e-mail address on record      Emergency Contact(s)  Name Relationship Lgl Grd Work Phone Home Phone Mobile Phone   1. JIMMY GUEVARA Spouse   [phone]   2. NO SECONDARY C*    none              Health Maintenance: Routine Health maintenance Reviewed: Due/Overdue   Health Maintenance Due   Topic Date Due     ZOSTER IMMUNIZATION (2 of 3) 12/02/2014     Pt to talk with provider about what is needed or can continue wait.        My Access Plan  Medical Emergency 919   Questions or concerns during clinic hours Primary Clinic Line, I will call the clinic directly: [facility] - [phone]   24 Hour Appointment Line [phone] or  [phone]) (toll free)   24 Hour Nurse Line [phone] (toll free)   Questions or concerns outside clinic hours 24 Hour Appointment Line, I will call the after-hours on-call line:   [facility] [phone] or [phone]) (toll-free)   Preferred Urgent Care [facility], [phone]   Preferred Hospital [facility]  [phone]   Preferred Pharmacy [facility]/pharmacy #[id number] - [place], [place] - [address],  [address] [facility]     Behavioral Health Crisis Line The National Suicide Prevention Lifeline at 1-514.325.9094 or 918     My Care Team Members  Patient Care Team       Relationship Specialty Notifications Start End    José Lee MD PCP - General Family Practice  7/1/14     Phone: [phone] Fax: [phone] [address] [facility] [address]    José Lee MD Assigned PCP   7/3/14     Phone:  [phone] Fax: [phone]         [address] [facility] [address]    Elham Chowdhury, RN Clinic Care Coordinator Primary Care - CC Admissions 3/13/19     Phone: [phone] Fax: [phone]               My Medical and Care Information  Problem List   Patient Active Problem List   Diagnosis     CARDIOVASCULAR SCREENING; LDL GOAL LESS THAN 160     Advanced directives, counseling/discussion     Hyperlipidemia with target LDL less than 130     Psoriasis     Bee sting-induced anaphylaxis     ED (erectile dysfunction)     Essential hypertension with goal blood pressure less than 140/90     Other male erectile dysfunction     Hypertension goal BP (blood pressure) < 150/90      Current Medications and Allergies:  See printed Medication Report

## 2019-03-12 NOTE — PATIENT INSTRUCTIONS
Dc'd from [facility] on 3/11/19 to home self care   Primary Problem: subdural Hemorrhage   LACE Score: 59

## 2019-03-13 RX ORDER — SIMVASTATIN 40 MG
40 TABLET ORAL
COMMUNITY
End: 2019-03-15

## 2019-03-13 RX ORDER — MECLIZINE HCL 12.5 MG 12.5 MG/1
12.5 TABLET ORAL
COMMUNITY
Start: 2019-03-13 | End: 2019-06-05

## 2019-03-13 RX ORDER — ONDANSETRON 4 MG/1
4 TABLET, ORALLY DISINTEGRATING ORAL
COMMUNITY
Start: 2019-03-13 | End: 2019-06-05

## 2019-03-13 RX ORDER — LISINOPRIL 20 MG/1
20 TABLET ORAL
COMMUNITY
End: 2019-03-15

## 2019-03-13 RX ORDER — ASPIRIN 81 MG/1
81 TABLET ORAL
COMMUNITY
End: 2019-06-05

## 2019-03-13 RX ORDER — ACETAMINOPHEN 325 MG/1
650 TABLET ORAL
COMMUNITY
Start: 2019-03-11 | End: 2019-06-05

## 2019-03-13 ASSESSMENT — ACTIVITIES OF DAILY LIVING (ADL): DEPENDENT_IADLS:: INDEPENDENT;TRANSPORTATION

## 2019-03-13 NOTE — PROGRESS NOTES
Clinic Care Coordination Contact  Care Team Conversations    Patient's wife Slime left Care Coordinator RN a VM stating they just got home from [facility] and heard Care Coordinator RN message.    Plan:  Care Coordinator RN to call Slime/patient back.    Elham Chowdhury RN, [facility]  RN Care Coordinator  [facility]  Phone # [phone]  3/13/2019 1:17 PM

## 2019-03-13 NOTE — PROGRESS NOTES
Clinic Care Coordination Contact    Clinic Care Coordination Contact  OUTREACH    Referral Information:  Referral Source: [facility]    Primary Diagnosis: Injury/Fall    Chief Complaint   Patient presents with     Clinic Care Coordination - Post Hospital     Dc'd from [facility] on 3/11/19 to home self care         Universal Utilization: No concerns at this time. Patient was seen at ER again this morning for Vertigo.  Clinic Utilization  Difficulty keeping appointments:: No  Compliance Concerns: No  No-Show Concerns: No  No PCP office visit in Past Year: No  Utilization    Last refreshed: 3/13/2019 11:23 AM:  Hospital Admissions 0           Last refreshed: 3/13/2019 11:23 AM:  ED Visits 0           Last refreshed: 3/13/2019 11:23 AM:  No Show Count (past year) 0              Current as of: 3/13/2019 11:23 AM              Clinical Concerns:  Care Coordinator RN spoke with patient's wife Slime (consent to communicate on file), she states patient was doing ok except this morning was super dizzy and nauseated so they brought him back to [facility] ER for evaluation. He was found to have Paroxysmal vertigo which is part of subdural hemorrhage. She states he was given Meclizine and Zofran to help, she states he is doing ok now since the medication has kicked in. She states she called Dr. Lee's office asking for his ears to be cleaned on Friday's appointment for hospital f/u and he also has complaints of knee pain. She denies having any other questions or concerns regarding the discharge instructions.     Current Medical Concerns:    Patient Active Problem List   Diagnosis     CARDIOVASCULAR SCREENING; LDL GOAL LESS THAN 160     Advanced directives, counseling/discussion     Hyperlipidemia with target LDL less than 130     Psoriasis     Bee sting-induced anaphylaxis     ED (erectile dysfunction)     Essential hypertension with goal blood pressure less than 140/90     Other male erectile dysfunction      Hypertension goal BP (blood pressure) < 150/90         Current Behavioral Concerns: No concerns at this time.       Education Provided to patient: RN CC educated about Care Coordination Services, discharge instructions, medications reviewed and follow up.      Pain  Pain (GOAL):: No  Health Maintenance Reviewed: Due/Overdue   Health Maintenance Due   Topic Date Due     ZOSTER IMMUNIZATION (2 of 3) 12/02/2014       Clinical Pathway: None    Medication Management:  Patient independent in medication management and verbalizes adherence and understanding of medication regimen. Medications reconciled.        Functional Status:  Dependent ADLs:: Independent  Dependent IADLs:: Independent, Transportation  Bed or wheelchair confined:: No  Mobility Status: Independent  Fallen 2 or more times in the past year?: No  Any fall with injury in the past year?: Yes(3/9/19 fell off of roof, sudural hemmorhage, )    Living Situation:  Current living arrangement:: I live in a private home with spouse    Diet/Exercise/Sleep:  Diet:: Regular  Inadequate nutrition (GOAL):: No  Food Insecurity: No  Tube Feeding: No  Exercise:: Currently not exercising  Inadequate activity/exercise (GOAL):: No  Significant changes in sleep pattern (GOAL): No    Transportation:  Transportation concerns (GOAL):: No  Transportation means:: Family, Regular car     Psychosocial:  Worship or spiritual beliefs that impact treatment:: No  Mental health DX:: No  Mental health management concern (GOAL):: No  Informal Support system:: Children, Family, Spouse     Financial/Insurance:   Denies having any concerns at this time.      Resources and Interventions:  Current Resources: RN CC mailed out to patient intro letter, access care plan and care coordination services brochure.    Community Resources: None  Supplies used at home:: None  Equipment Currently Used at Home: none    Advance Care Plan/Directive  Advanced Care Plans/Directives on file:: No    Referrals  Placed: None    Future Appointments              In 2 days [facility]          Plan:   Patient will continue to follow treatment plan as directed and follow up with PCP with concerns ongoing.   Patient to attend his hospital f/u with PCP on Friday 3/15/19.  Care Coordinator RN will f/u with patient/spouse in two weeks.    Elham Chowdhury RN, [facility]  RN Care Coordinator  [facility]  Phone # [phone]  3/13/2019 2:01 PM

## 2019-03-13 NOTE — PROGRESS NOTES
Clinic Care Coordination Contact  [facility]/Voicemail    Referral Source: [facility]    Clinical Data: Care Coordinator Outreach, discharged from [facility] 3/11/19 Fall, Subdural hemorrhage, occipital fracture. Hospital f/u with PCP 3/15/19 @ 2:45pm.     Outreach attempted x 1.  Left message on voicemail with call back information and requested return call.    Plan: Care Coordinator will mail out care coordination introduction letter with care coordinator contact information and explanation of care coordination services. Care Coordinator will try to reach patient again in 1-2 business days.    Elham Chowdhury RN, [facility]  RN Care Coordinator  [facility], [facility]  Phone # [phone]  3/13/2019 9:38 AM

## 2019-03-15 ENCOUNTER — PATIENT OUTREACH (OUTPATIENT)
Dept: CARE COORDINATION | Facility: CLINIC | Age: 69
End: 2019-03-15

## 2019-03-15 ENCOUNTER — OFFICE VISIT (OUTPATIENT)
Dept: FAMILY MEDICINE | Facility: CLINIC | Age: 69
End: 2019-03-15
Payer: COMMERCIAL

## 2019-03-15 VITALS
SYSTOLIC BLOOD PRESSURE: 122 MMHG | HEIGHT: 71 IN | RESPIRATION RATE: 20 BRPM | TEMPERATURE: 97.8 F | OXYGEN SATURATION: 98 % | BODY MASS INDEX: 28.42 KG/M2 | DIASTOLIC BLOOD PRESSURE: 80 MMHG | WEIGHT: 203 LBS | HEART RATE: 61 BPM

## 2019-03-15 DIAGNOSIS — I62.00 SUBDURAL HEMORRHAGE (H): Primary | ICD-10-CM

## 2019-03-15 DIAGNOSIS — N52.8 OTHER MALE ERECTILE DYSFUNCTION: ICD-10-CM

## 2019-03-15 PROCEDURE — 99496 TRANSJ CARE MGMT HIGH F2F 7D: CPT | Performed by: FAMILY MEDICINE

## 2019-03-15 RX ORDER — SILDENAFIL CITRATE 20 MG/1
TABLET ORAL
Qty: 30 TABLET | Refills: 1 | Status: SHIPPED | OUTPATIENT
Start: 2019-03-15 | End: 2020-03-17

## 2019-03-15 ASSESSMENT — MIFFLIN-ST. JEOR: SCORE: 1699.99

## 2019-03-15 ASSESSMENT — ACTIVITIES OF DAILY LIVING (ADL): DEPENDENT_IADLS:: INDEPENDENT;TRANSPORTATION

## 2019-03-15 ASSESSMENT — PAIN SCALES - GENERAL: PAINLEVEL: NO PAIN (0)

## 2019-03-15 NOTE — NURSING NOTE
"Chief Complaint   Patient presents with     Hospital F/U     Fall       Initial /80   Pulse 61   Temp 97.8  F (36.6  C) (Oral)   Resp 20   Ht 1.791 m (5' 10.5\")   Wt 92.1 kg (203 lb)   SpO2 98%   BMI 28.72 kg/m   Estimated body mass index is 28.72 kg/m  as calculated from the following:    Height as of this encounter: 1.791 m (5' 10.5\").    Weight as of this encounter: 92.1 kg (203 lb).    Mimi Light CMA    "

## 2019-03-15 NOTE — PROGRESS NOTES
SUBJECTIVE:   Harvey Guevara is a 68 year old male who presents to clinic today for the following health issues:      Follow-up subdural hemorrhage after fall. Normal renal panel/cbc and trop. ekg and ct head/lumbar, thoracic and cervical spine done. Normal chest xray. Given tylenol for pain and advised follow-up with [facility] Neurosurgery - no follow-up scheduled. .   History htn, high cholesterol and ed.  Landed on right temple.  Last ct 2 days ago - resolving hemorrhage.   zofran odt and meclizine given at follow-up er visit. Overall nausea and dizziness improving. Occasionally headaches - tylenol prn helpful. Emotionally doing ok.   No mental status changes - per wife doing ok. No chest pain or shortness of breath.   bilateral ear plugging - history waxy tms. No cerebral fluid form tms'.   No sinus congestion. No fevers or chills.       Per discharge summary:    BRIEF HOSPITAL COURSE: This 68 y.o. male admitted to [facility] under the Trauma Service on 3/9/19 following a fall outside his home that resulted in a subdural hemorrhage, right occipital fracture, petrous bone fracture and temporal squamosal fracture. He was consulted to Neurosurgery who deemed his injuries non-operative. He progressed nicely during his admission, advancing to a regular diet, working with therapies and ultimately cleared for discharge to home on 3/11/19.        Hospital Follow-up Visit:    Hospital/Nursing Home/IP Rehab Facility: [facility]  Date of Admission: 03-  Date of Discharge: 03-/ then went back on 03-  Reason(s) for Admission: fall / subdural hemorrhage             Problems taking medications regularly:  None       Medication changes since discharge: None       Problems adhering to non-medication therapy:  None    Summary of hospitalization:  CareEverywhere information obtained and reviewed  Diagnostic Tests/Treatments reviewed.  Follow up needed: none  Other Healthcare Providers  Involved in Patient s Care:         None  Update since discharge: improved.     Post Discharge Medication Reconciliation: discharge medications reconciled, continue medications without change.  Plan of care communicated with patient and family     Coding guidelines for this visit:  Type of Medical   Decision Making Face-to-Face Visit       within 7 Days of discharge Face-to-Face Visit        within 14 days of discharge   Moderate Complexity [id number]   High Complexity [id number]              PROBLEMS TO ADD ON...    Problem list and histories reviewed & adjusted, as indicated.  Additional history: as documented    Patient Active Problem List   Diagnosis     CARDIOVASCULAR SCREENING; LDL GOAL LESS THAN 160     Advanced directives, counseling/discussion     Hyperlipidemia with target LDL less than 130     Psoriasis     Bee sting-induced anaphylaxis     ED (erectile dysfunction)     Essential hypertension with goal blood pressure less than 140/90     Other male erectile dysfunction     Hypertension goal BP (blood pressure) < 150/90     Past Surgical History:   Procedure Laterality Date     COLONOSCOPY  2011    polyp-repeat 5 years     HERNIA REPAIR       hisory of colonoscopy  03.2011    repeat in 5 years       Social History     Tobacco Use     Smoking status: Former Smoker     Smokeless tobacco: Never Used     Tobacco comment: Lives in smoke free household   Substance Use Topics     Alcohol use: Yes     Alcohol/week: 0.0 oz     Comment: 1-2 beers at night      Family History   Problem Relation Age of Onset     Cancer Brother         skin cancer     Cancer Sister         skin cancer     Diabetes No family hx of      Hypertension No family hx of      Cerebrovascular Disease No family hx of      Thyroid Disease No family hx of      Glaucoma No family hx of      Macular Degeneration No family hx of            Reviewed and updated as needed this visit by clinical staff  Tobacco  Allergies       Reviewed and updated  "as needed this visit by Provider         ROS:  All other ROS negative.     OBJECTIVE:     /80   Pulse 61   Temp 97.8  F (36.6  C) (Oral)   Resp 20   Ht 1.791 m (5' 10.5\")   Wt 92.1 kg (203 lb)   SpO2 98%   BMI 28.72 kg/m    Body mass index is 28.72 kg/m .  GENERAL: healthy, alert and no distress  EYES: Eyes grossly normal to inspection, PERRL and conjunctivae and sclerae normal  HENT: ear canals bilateral cerumen impaction - gently lavaged by MA clear and felt better. and TM's normal, nose and mouth without ulcers or lesions  NECK: no adenopathy, no asymmetry, masses, or scars and thyroid normal to palpation  RESP: lungs clear to auscultation - no rales, rhonchi or wheezes  CV: regular rate and rhythm, normal S1 S2, no S3 or S4, no murmur, click or rub, no peripheral edema and peripheral pulses strong  ABDOMEN: soft, nontender, no hepatosplenomegaly, no masses and bowel sounds normal  MS: no gross musculoskeletal defects noted, no edema  NEURO: Normal strength and tone, mentation intact and speech normal  NEURO: normal gait/balance/ FNF  PSYCH: mentation appears normal, affect normal/bright        ASSESSMENT/PLAN:     ASSESSMENT / PLAN:  (I62.00) Subdural hemorrhage (H)  (primary encounter diagnosis)  Comment: doing well.  Plan: continue rest and if reinjury to heat to ER. Prn zofran/meclizine and keep well hydrated/get up slowly. Expected course and warning signs reviewed. Call/email with questions/concerns    (N52.8) Other male erectile dysfunction  Plan: sildenafil (REVATIO) 20 MG tablet        Advised NOT intercourse for at least 2-3 weeks or longer if symptoms not resolved. To ER if chest pain, shortness of breath , prolonged erections          José Lee MD  [facility]    "

## 2019-03-15 NOTE — PATIENT INSTRUCTIONS
DC'd from [facility] on 3/13/19 to home self care   Primary Problem: None  LACE Score: 55

## 2019-03-15 NOTE — PROGRESS NOTES
Clinic Care Coordination Contact    Clinic Care Coordination Contact  OUTREACH    Referral Information:  Referral Source: [facility]    Primary Diagnosis: Injury/Fall    Chief Complaint   Patient presents with     Clinic Care Coordination - Post Hospital     DC'd from [facility] on 3/13/19 to home self care      Care Team        [place] Utilization:   Clinic Utilization  Difficulty keeping appointments:: No  Compliance Concerns: No  No-Show Concerns: No  No PCP office visit in Past Year: No  Utilization    Last refreshed: 3/14/2019  7:29 AM:  Hospital Admissions 0           Last refreshed: 3/14/2019  7:29 AM:  ED Visits 0           Last refreshed: 3/14/2019  7:29 AM:  No Show Count (past year) 0              Current as of: 3/14/2019  7:29 AM              Clinical Concerns:  Current Medical Concerns:  Patient doing well today. Has follow up with PCP this afternoon. Patient has not had any n/v or dizziness since ED visit. No other concerns reported at this time.     Current Behavioral Concerns: none    Education Provided to patient: Encouraged follow up appointment with PCP.    Medication Management:  Patient is knowledgeable on medications and is adherent. No financial concerns reported at this time.        Functional Status:  Dependent ADLs:: Independent  Dependent IADLs:: Independent, Transportation  Bed or wheelchair confined:: No  Mobility Status: Independent    Living Situation:  Current living arrangement:: I live in a private home with spouse    Diet/Exercise/Sleep:  Diet:: Regular  Inadequate nutrition (GOAL):: No  Food Insecurity: No  Tube Feeding: No  Exercise:: Currently not exercising  Inadequate activity/exercise (GOAL):: No  Significant changes in sleep pattern (GOAL): No    Transportation:  Transportation concerns (GOAL):: No  Transportation means:: Family, Regular car     Psychosocial:  Scientology or spiritual beliefs that impact treatment:: No  Mental health DX:: No  Mental health  management concern (GOAL):: No  Informal Support system:: Children, Family, Spouse     Financial/Insurance:           Resources and Interventions:  Current Resources:    ;   Community Resources: None  Supplies used at home:: None  Equipment Currently Used at Home: none    Advance Care Plan/Directive  Advanced Care Plans/Directives on file:: No    Referrals Placed: None        Future Appointments              Today José Lee MD [facility]          Plan: 1) Patient will continue to follow treatment plan as directed and follow up with PCP with concerns ongoing.   2) Care Coordination to remain available for future needs.     Janusz Kowalski RN  Clinic Care Coordinator  [phone] or [phone]

## 2019-03-21 ENCOUNTER — ANCILLARY PROCEDURE (OUTPATIENT)
Dept: GENERAL RADIOLOGY | Facility: CLINIC | Age: 69
End: 2019-03-21
Attending: FAMILY MEDICINE
Payer: COMMERCIAL

## 2019-03-21 ENCOUNTER — OFFICE VISIT (OUTPATIENT)
Dept: FAMILY MEDICINE | Facility: CLINIC | Age: 69
End: 2019-03-21
Payer: COMMERCIAL

## 2019-03-21 VITALS
BODY MASS INDEX: 28.57 KG/M2 | OXYGEN SATURATION: 97 % | SYSTOLIC BLOOD PRESSURE: 159 MMHG | HEART RATE: 68 BPM | WEIGHT: 202 LBS | DIASTOLIC BLOOD PRESSURE: 80 MMHG

## 2019-03-21 DIAGNOSIS — M54.16 LUMBAR RADICULOPATHY: Primary | ICD-10-CM

## 2019-03-21 DIAGNOSIS — M54.16 LUMBAR RADICULOPATHY: ICD-10-CM

## 2019-03-21 LAB
ALBUMIN UR-MCNC: NEGATIVE MG/DL
APPEARANCE UR: CLEAR
BILIRUB UR QL STRIP: NEGATIVE
COLOR UR AUTO: YELLOW
GLUCOSE UR STRIP-MCNC: NEGATIVE MG/DL
HGB UR QL STRIP: NEGATIVE
KETONES UR STRIP-MCNC: NEGATIVE MG/DL
LEUKOCYTE ESTERASE UR QL STRIP: NEGATIVE
NITRATE UR QL: NEGATIVE
PH UR STRIP: 6.5 PH (ref 5–7)
RBC #/AREA URNS AUTO: NORMAL /HPF
SOURCE: NORMAL
SP GR UR STRIP: 1.01 (ref 1–1.03)
UROBILINOGEN UR STRIP-ACNC: 0.2 EU/DL (ref 0.2–1)
WBC #/AREA URNS AUTO: NORMAL /HPF

## 2019-03-21 PROCEDURE — 99214 OFFICE O/P EST MOD 30 MIN: CPT | Performed by: FAMILY MEDICINE

## 2019-03-21 PROCEDURE — 81001 URINALYSIS AUTO W/SCOPE: CPT | Performed by: FAMILY MEDICINE

## 2019-03-21 PROCEDURE — 72100 X-RAY EXAM L-S SPINE 2/3 VWS: CPT

## 2019-03-21 RX ORDER — CYCLOBENZAPRINE HCL 10 MG
10 TABLET ORAL 3 TIMES DAILY PRN
Qty: 30 TABLET | Refills: 1 | Status: SHIPPED | OUTPATIENT
Start: 2019-03-21 | End: 2019-06-05

## 2019-03-21 NOTE — PROGRESS NOTES
SUBJECTIVE:  68 year old.The patient has a complaint of pelvic pain .  This started 3 weeks ago when he slipped on the ice and fell backwards and had a brain bleed.  Took a walk 3 days ago and developed the pelvic pain . Location worse on the left quality sharp pain Associated symptoms are radiates to left leg just past the knee..  Brought on by walkintg one mile  .  Better with ice.. ROS no fevers or chills. No lightheadedness       Reviewed health maintenance  Patient Active Problem List   Diagnosis     CARDIOVASCULAR SCREENING; LDL GOAL LESS THAN 160     Advanced directives, counseling/discussion     Hyperlipidemia with target LDL less than 130     Psoriasis     Bee sting-induced anaphylaxis     ED (erectile dysfunction)     Essential hypertension with goal blood pressure less than 140/90     Other male erectile dysfunction     Hypertension goal BP (blood pressure) < 150/90     Past Medical History:   Diagnosis Date     Adenomatous colon polyp 2008     Benign prostatic hypertrophy      Eczema      Hypercholesterolemia      Hypertension      Inguinal hernia      Sinus bradycardia        OBJECTIVE:  no apparent distress  /80   Pulse 68   Wt 91.6 kg (202 lb)   SpO2 97%   BMI 28.57 kg/m      LUNGS:  CTA B/L, no wheezing or crackles.   Cardiovascular: negative, PMI normal. No lifts, heaves, or thrills. RRR. No murmurs, clicks gallops or rub   Gastrointestinal: Abdomen soft, non-tender. BS normal. No masses, organomegaly   positive left leg raise  Minimal   SI tenderness  No pubic symphyses pain  left CVA tenderness  UA RESULTS:  Recent Labs   Lab Test 03/21/19  1504   COLOR Yellow   APPEARANCE Clear   URINEGLC Negative   URINEBILI Negative   URINEKETONE Negative   SG 1.010   UBLD Negative   URINEPH 6.5   PROTEIN Negative   UROBILINOGEN 0.2   NITRITE Negative   LEUKEST Negative   RBCU O - 2   WBCU 0 - 5          ICD-10-CM    1. Lumbar radiculopathy M54.16 XR Lumbar Spine 2/3 Views     UA with Microscopic      cyclobenzaprine (FLEXERIL) 10 MG tablet    PLAN: Re check 2 weeks

## 2019-03-27 ENCOUNTER — PATIENT OUTREACH (OUTPATIENT)
Dept: CARE COORDINATION | Facility: CLINIC | Age: 69
End: 2019-03-27

## 2019-03-27 ASSESSMENT — ACTIVITIES OF DAILY LIVING (ADL): DEPENDENT_IADLS:: INDEPENDENT;TRANSPORTATION

## 2019-03-27 NOTE — PROGRESS NOTES
Clinic Care Coordination Contact    Situation: Patient chart reviewed by care coordinator.    Background: F/u from subdural hemorrhage.     Assessment: Per chart review, patient has seen PCP on 3/15/19, noted to be doing ok. Reviewed the warning signs and when to notify PCP.  Patient had talked with clinic care coordinator Janusz the same day, denies having any concerns.     Plan/Recommendations: No further Care Coordination needs identified at this time. Patient may be referred to Care Coordination in the future if additional needs arise.  Pt encouraged to contact Care Coordinator through the clinic if situation changes and assistance is needed. No follow-up planned.      Elham Chowdhury RN, [facility]  RN Care Coordinator  [facility]  Phone # [phone]  3/27/2019 2:47 PM

## 2019-06-05 ENCOUNTER — OFFICE VISIT (OUTPATIENT)
Dept: FAMILY MEDICINE | Facility: CLINIC | Age: 69
End: 2019-06-05
Payer: COMMERCIAL

## 2019-06-05 VITALS
HEART RATE: 88 BPM | BODY MASS INDEX: 28 KG/M2 | DIASTOLIC BLOOD PRESSURE: 73 MMHG | HEIGHT: 71 IN | OXYGEN SATURATION: 97 % | WEIGHT: 200 LBS | TEMPERATURE: 97.8 F | SYSTOLIC BLOOD PRESSURE: 134 MMHG

## 2019-06-05 DIAGNOSIS — M25.562 ACUTE PAIN OF LEFT KNEE: Primary | ICD-10-CM

## 2019-06-05 PROCEDURE — 99213 OFFICE O/P EST LOW 20 MIN: CPT | Performed by: INTERNAL MEDICINE

## 2019-06-05 ASSESSMENT — MIFFLIN-ST. JEOR: SCORE: 1686.38

## 2019-06-05 NOTE — PROGRESS NOTES
SUBJECTIVE:  Harvey Guevara is an 68 year old male who presents for left knee pain.  Eight or nine months ago thinks he hyperextended the knee when he jumped down from a tractor.  Has been nursing it since then.  Sometimes wears a neoprene sleeve.  Had a little swelling which improved but recently has had some swelling again.  Has minimal pain if not on it.  Takes aleve sometimes.  Has pain when straightens knee out and feels like not strong.  Sometimes feels like will give out on uneven surfaces.  No redness or bruising around knee.  Not feel knee pop.  Walks with a bit of a limp as doesn't like to fully straighten knee.  No past knee injuries or surgeries or problems.  Other knee is fine.  Takes ibuprofen or aleve sometimes which helps with pain.  No other joints bothering him.  No fevers, chills, sweats.  Normal energy level.    PMH:   has a past medical history of Adenomatous colon polyp (2008), Benign prostatic hypertrophy, Eczema, Hypercholesterolemia, Hypertension, Inguinal hernia, and Sinus bradycardia.  Patient Active Problem List   Diagnosis     CARDIOVASCULAR SCREENING; LDL GOAL LESS THAN 160     Advanced directives, counseling/discussion     Hyperlipidemia with target LDL less than 130     Psoriasis     Bee sting-induced anaphylaxis     ED (erectile dysfunction)     Essential hypertension with goal blood pressure less than 140/90     Other male erectile dysfunction     Hypertension goal BP (blood pressure) < 150/90     Social History     Socioeconomic History     Marital status:      Spouse name: None     Number of children: None     Years of education: None     Highest education level: None   Occupational History     None   Social Needs     Financial resource strain: None     Food insecurity:     Worry: None     Inability: None     Transportation needs:     Medical: None     Non-medical: None   Tobacco Use     Smoking status: Former Smoker     Smokeless tobacco: Never Used     Tobacco comment:  Lives in smoke free household   Substance and Sexual Activity     Alcohol use: Yes     Alcohol/week: 0.0 oz     Comment: 1-2 beers at night      Drug use: No     Sexual activity: Yes     Partners: Female   Lifestyle     Physical activity:     Days per week: None     Minutes per session: None     Stress: None   Relationships     Social connections:     Talks on phone: None     Gets together: None     Attends Church service: None     Active member of club or organization: None     Attends meetings of clubs or organizations: None     Relationship status: None     Intimate partner violence:     Fear of current or ex partner: None     Emotionally abused: None     Physically abused: None     Forced sexual activity: None   Other Topics Concern     Parent/sibling w/ CABG, MI or angioplasty before 65F 55M? Not Asked   Social History Narrative     None     Family History   Problem Relation Age of Onset     Cancer Brother         skin cancer     Cancer Sister         skin cancer     Diabetes No family hx of      Hypertension No family hx of      Cerebrovascular Disease No family hx of      Thyroid Disease No family hx of      Glaucoma No family hx of      Macular Degeneration No family hx of        ALLERGIES:  Patient has no known allergies.    Current Outpatient Medications   Medication     aspirin 81 MG tablet     fluticasone (FLONASE) 50 MCG/ACT nasal spray     lisinopril (PRINIVIL/ZESTRIL) 20 MG tablet     Multiple Vitamins-Minerals (CENTRUM SILVER) per tablet     sildenafil (REVATIO) 20 MG tablet     simvastatin (ZOCOR) 40 MG tablet     triamcinolone (KENALOG) 0.5 % cream     valACYclovir (VALTREX) 1000 mg tablet     No current facility-administered medications for this visit.          ROS:  ROS is done and is negative for general/constitutional, eye, ENT, Respiratory, cardiovascular, GI, , Skin, musculoskeletal except as noted elsewhere.  All other review of systems negative except as noted  "elsewhere.      OBJECTIVE:  /73   Pulse 88   Temp 97.8  F (36.6  C) (Oral)   Ht 1.791 m (5' 10.5\")   Wt 90.7 kg (200 lb)   SpO2 97%   BMI 28.29 kg/m    GENERAL APPEARANCE: Alert, in no acute distress  EYES: normal  NOSE:normal  NECK:no adenopathy  RESP: normal and clear to auscultation  CV:regular rate and rhythm and no murmurs, clicks, or gallops  SKIN: no ulcers, lesions or rash  MUSCULOSKELETAL:left knee- moderate anterior edema which is non-tender.  No erythema or bruising.  No increased warmth.  Mild pain with extension which limits rom testing.  Mild lateral joint line tenderness.  No joint instability noted.      RESULTS  Results for orders placed or performed in visit on 03/21/19   XR Lumbar Spine 2/3 Views    Narrative    XR LUMBAR SPINE 2-3 VIEWS 3/21/2019 3:04 PM    COMPARISON: None.    HISTORY: Lumbar radiculopathy.      Impression    IMPRESSION: Vertebral heights and disc spaces are preserved without  evidence of fracture. No listhesis identified at any level.    CHARLOTTE TINOCO MD   .  No results found for this or any previous visit (from the past 48 hour(s)).    ASSESSMENT/PLAN:    ASSESSMENT / PLAN:  (M25.562) Acute pain of left knee  (primary encounter diagnosis)  Comment: with swelling and h/o injury several months ago.  Will refer to ortho for further evaluation of his swelling and pain  Plan: ORTHO  REFERRAL        I reviewed supportive care including icing and elevation, otc meds to use if needed, expected course, and signs of concern.  Follow up with ortho within 1 week(s) or if worsens in any way.  Reviewed with him some things ortho might do such as refer to PT, do an MRI or xray, etc.  Reviewed red flag symptoms and is to go to the ER if experiences any of these.      See [facility] for orders, medications, letters, patient instructions    Tatiana Pradhan M.D.    "

## 2019-06-05 NOTE — NURSING NOTE
"Chief Complaint   Patient presents with     Knee Pain     Lt knee injury. about 8-9 months ago. hyper extended his knee while numping off tractor. Now has water retention and weakness in the knee       Initial /73   Pulse 88   Temp 97.8  F (36.6  C) (Oral)   Ht 1.791 m (5' 10.5\")   Wt 90.7 kg (200 lb)   SpO2 97%   BMI 28.29 kg/m   Estimated body mass index is 28.29 kg/m  as calculated from the following:    Height as of this encounter: 1.791 m (5' 10.5\").    Weight as of this encounter: 90.7 kg (200 lb)..  BP completed using cuff size: large    "

## 2019-06-06 ENCOUNTER — ANCILLARY PROCEDURE (OUTPATIENT)
Dept: GENERAL RADIOLOGY | Facility: CLINIC | Age: 69
End: 2019-06-06
Attending: ORTHOPAEDIC SURGERY
Payer: COMMERCIAL

## 2019-06-06 ENCOUNTER — OFFICE VISIT (OUTPATIENT)
Dept: ORTHOPEDICS | Facility: CLINIC | Age: 69
End: 2019-06-06
Payer: COMMERCIAL

## 2019-06-06 VITALS
HEIGHT: 71 IN | HEART RATE: 58 BPM | DIASTOLIC BLOOD PRESSURE: 83 MMHG | SYSTOLIC BLOOD PRESSURE: 169 MMHG | WEIGHT: 200 LBS | BODY MASS INDEX: 28 KG/M2 | OXYGEN SATURATION: 98 %

## 2019-06-06 DIAGNOSIS — M11.20 CHONDROCALCINOSIS: ICD-10-CM

## 2019-06-06 DIAGNOSIS — M25.562 LEFT KNEE PAIN, UNSPECIFIED CHRONICITY: Primary | ICD-10-CM

## 2019-06-06 DIAGNOSIS — M25.562 LEFT KNEE PAIN, UNSPECIFIED CHRONICITY: ICD-10-CM

## 2019-06-06 DIAGNOSIS — M70.42 PREPATELLAR BURSITIS OF LEFT KNEE: ICD-10-CM

## 2019-06-06 PROCEDURE — 99203 OFFICE O/P NEW LOW 30 MIN: CPT | Performed by: ORTHOPAEDIC SURGERY

## 2019-06-06 PROCEDURE — 73562 X-RAY EXAM OF KNEE 3: CPT | Mod: LT

## 2019-06-06 ASSESSMENT — MIFFLIN-ST. JEOR: SCORE: 1686.38

## 2019-06-06 NOTE — PROGRESS NOTES
"SUBJECTIVE:   Harvey Guevara is a 68 year old male who is seen in consultation at the request of Dr. Pradhan for evaluation of left knee pain that has been present approximately 8-9 months ago after jumping off his [employer] tractor. He feels he may have hyperextended the knee. Since then, he has had some posterior knee pain with extension. He notes that the pain is mild, but wanted to be seen to assess the swelling. He has had mild anterior swelling, \"juicy\" fluid, in the knee which have waxed and waned. Anti-inflammatories for treatment.     Present symptoms: mild posterior left knee pain with full extension, ongoing prepatellar swelling      Symptoms occur: with full extension kneeling.    Treatments tried to this point: NSAIDs    Orthopedic PMH: none    Review of Systems:  Constitutional:  NEGATIVE for fever, chills, change in weight  Integumentary/Skin:  NEGATIVE for worrisome rashes, moles or lesions  Eyes:  NEGATIVE for vision changes or irritation  ENT/Mouth:  NEGATIVE for ear, mouth and throat problems  Resp:  NEGATIVE for significant cough or SOB  Breast:  NEGATIVE for masses, tenderness or discharge  CV:  NEGATIVE for chest pain, palpitations or peripheral edema  GI:  NEGATIVE for nausea, abdominal pain, heartburn, or change in bowel habits  :  Negative   Musculoskeletal:  See HPI above  Neuro:  NEGATIVE for weakness, dizziness or paresthesias  Endocrine:  NEGATIVE for temperature intolerance, skin/hair changes  Heme/allergy/immune:  NEGATIVE for bleeding problems  Psychiatric:  NEGATIVE for changes in mood or affect    Past Medical History:   Past Medical History:   Diagnosis Date     Adenomatous colon polyp 2008     Benign prostatic hypertrophy      Eczema      Hypercholesterolemia      Hypertension      Inguinal hernia      Sinus bradycardia      Past Surgical History:   Past Surgical History:   Procedure Laterality Date     COLONOSCOPY  2011    polyp-repeat 5 years     HERNIA REPAIR       hisory " "of colonoscopy  03.2011    repeat in 5 years     Family History:   Family History   Problem Relation Age of Onset     Cancer Brother         skin cancer     Cancer Sister         skin cancer     Diabetes No family hx of      Hypertension No family hx of      Cerebrovascular Disease No family hx of      Thyroid Disease No family hx of      Glaucoma No family hx of      Macular Degeneration No family hx of      Social History:   Social History     Tobacco Use     Smoking status: Former Smoker     Smokeless tobacco: Never Used     Tobacco comment: Lives in smoke free household   Substance Use Topics     Alcohol use: Yes     Alcohol/week: 0.0 oz     Comment: 1-2 beers at night      This document serves as a record of the services and decisions personally performed and made by TATI De Dios MD. It was created on his behalf by Rosalind Nova, a trained medical scribe. The creation of this document is based the provider's statements to the medical scribe.    Scribe Rosalind Nova 8:15 AM 6/6/2019        OBJECTIVE:  Physical Exam:  /83 (BP Location: Right arm, Patient Position: Sitting, Cuff Size: Adult Regular)   Pulse 58   Ht 1.791 m (5' 10.5\")   Wt 90.7 kg (200 lb)   SpO2 98%   BMI 28.29 kg/m    General Appearance: healthy, alert and no distress   Skin: no suspicious lesions or rashes  Neuro: Normal strength and tone, mentation intact and speech normal  Vascular: good pulses, and capillary refill   Lymph: no lymphadenopathy   Psych:  mentation appears normal and affect normal/bright  Resp: no increased work of breathing     Left Hip Exam:   No pain with hip range of motion     Left Knee Exam:  Gait: walks with normal gait  Alignment: pes planus left side   Squat: 80 % limited by pain.    Patellofemoral joint: minimal crepitations in the patellofemoral joint.  Effusion: mild, diffuse prepatellar effusion which extends down into the patellar tendon.   ROM: 2* extension to 130* flexion  Tender: lateral joint " line  Ligaments:   Lachman's: stable   Anterior/Posterior drawer: stable,   Varus/Valgus stress: stable to varus and valgus stress  McMurrays: positive medially      X-rays:  Obtained x-rays of the left knee: 3-views, taken today 6/6/2019. Images were reviewed in the office with the patient today and show minimal degenerative changes in the knee. There is chondrocalcinosis medially and laterally.      ASSESSMENT:   1. Chondrocalcinosis of left knee, possible medial meniscus tear   2. Prepatellar bursitis, left knee.    PLAN:   I discussed exam findings with patient. Symptoms could be related to a meniscus tear. I think his prepatellar bursal swelling is unrelated to his injury; in my opinion, it is more likely from repetitive kneeling.     Pain:   We talked about possible nest steps for the knee itself including steroid injections versus MRI. At this time, he is not interested as pain is not his biggest concern at this time. I also offered physical therapy, which he has declined today. Advised continued nonsteroidal anti-inflammatory drugs as needed.     Swelling:   We also discussed treatment for bursitis including possible aspiration and steroid injection. Also discussed more conservative treatments such as wrapping the knee, nonsteroidal anti-inflammatory drugs and ice. At this time, he would like to continue with at home treatments and will call if he would like an aspiration/injection in future.       Return to clinic: as needed     The information in this document, created by a scribe for me, accurately reflects the services I personally performed and the decisions made by me. I have reviewed and approved this document for accuracy.      TATI De Dios MD  Dept. Orthopedic Surgery  [facility]

## 2019-06-06 NOTE — PATIENT INSTRUCTIONS
Swelling:   - Avoid kneeling or putting pressure onto the kneecap to improve the swelling in the knee. If you need to kneel, use knee pads.  - Also discussed draining and injecting the area with a steroid, (not directly inside the knee).  - Consider the use of compression such as wrapping the knee or using a compression sleeve.  - Nonsteroidal anti-inflammatory drugs as needed.    Pain:  - If you are noticing increased pain, there are a few options as well.  - Steroid injection into the knee for pain relief and reducing inflammation  - Consider MRI for further evaluation of the meniscus.  - Also consider physical therapy for strengthening of surrounding muscles.  - Nonsteroidal anti-inflammatory drugs as needed.

## 2019-06-06 NOTE — LETTER
"    6/6/2019         RE: Harvey Guevara  [address]   [place] [address]        Dear Colleague,    Thank you for referring your patient, Harvey Guevara, to the [facility]. Please see a copy of my visit note below.    SUBJECTIVE:   Harvey Guevara is a 68 year old male who is seen in consultation at the request of Dr. Pradhan for evaluation of left knee pain that has been present approximately 8-9 months ago after jumping off his [employer] tractor. He feels he may have hyperextended the knee. Since then, he has had some posterior knee pain with extension. He notes that the pain is mild, but wanted to be seen to assess the swelling. He has had mild anterior swelling, \"juicy\" fluid, in the knee which have waxed and waned. Anti-inflammatories for treatment.     Present symptoms: mild posterior left knee pain with full extension, ongoing prepatellar swelling      Symptoms occur: with full extension kneeling.    Treatments tried to this point: NSAIDs    Orthopedic PMH: none    Review of Systems:  Constitutional:  NEGATIVE for fever, chills, change in weight  Integumentary/Skin:  NEGATIVE for worrisome rashes, moles or lesions  Eyes:  NEGATIVE for vision changes or irritation  ENT/Mouth:  NEGATIVE for ear, mouth and throat problems  Resp:  NEGATIVE for significant cough or SOB  Breast:  NEGATIVE for masses, tenderness or discharge  CV:  NEGATIVE for chest pain, palpitations or peripheral edema  GI:  NEGATIVE for nausea, abdominal pain, heartburn, or change in bowel habits  :  Negative   Musculoskeletal:  See HPI above  Neuro:  NEGATIVE for weakness, dizziness or paresthesias  Endocrine:  NEGATIVE for temperature intolerance, skin/hair changes  Heme/allergy/immune:  NEGATIVE for bleeding problems  Psychiatric:  NEGATIVE for changes in mood or affect    Past Medical History:   Past Medical History:   Diagnosis Date     Adenomatous colon polyp 2008     Benign prostatic hypertrophy      " "Eczema      Hypercholesterolemia      Hypertension      Inguinal hernia      Sinus bradycardia      Past Surgical History:   Past Surgical History:   Procedure Laterality Date     COLONOSCOPY  2011    polyp-repeat 5 years     HERNIA REPAIR       hisory of colonoscopy  03.2011    repeat in 5 years     Family History:   Family History   Problem Relation Age of Onset     Cancer Brother         skin cancer     Cancer Sister         skin cancer     Diabetes No family hx of      Hypertension No family hx of      Cerebrovascular Disease No family hx of      Thyroid Disease No family hx of      Glaucoma No family hx of      Macular Degeneration No family hx of      Social History:   Social History     Tobacco Use     Smoking status: Former Smoker     Smokeless tobacco: Never Used     Tobacco comment: Lives in smoke free household   Substance Use Topics     Alcohol use: Yes     Alcohol/week: 0.0 oz     Comment: 1-2 beers at night      This document serves as a record of the services and decisions personally performed and made by TATI De Dios MD. It was created on his behalf by Rosalind Nova, a trained medical scribe. The creation of this document is based the provider's statements to the medical scribe.    Scribe Rosalind Nova 8:15 AM 6/6/2019        OBJECTIVE:  Physical Exam:  /83 (BP Location: Right arm, Patient Position: Sitting, Cuff Size: Adult Regular)   Pulse 58   Ht 1.791 m (5' 10.5\")   Wt 90.7 kg (200 lb)   SpO2 98%   BMI 28.29 kg/m     General Appearance: healthy, alert and no distress   Skin: no suspicious lesions or rashes  Neuro: Normal strength and tone, mentation intact and speech normal  Vascular: good pulses, and capillary refill   Lymph: no lymphadenopathy   Psych:  mentation appears normal and affect normal/bright  Resp: no increased work of breathing     Left Hip Exam:   No pain with hip range of motion     Left Knee Exam:  Gait: walks with normal gait  Alignment: pes planus left side "   Squat: 80 % limited by pain.    Patellofemoral joint: minimal crepitations in the patellofemoral joint.  Effusion: mild, diffuse prepatellar effusion which extends down into the patellar tendon.   ROM: 2* extension to 130* flexion  Tender: lateral joint line  Ligaments:   Lachman's: stable   Anterior/Posterior drawer: stable,   Varus/Valgus stress: stable to varus and valgus stress  McMurrays: positive medially      X-rays:  Obtained x-rays of the left knee: 3-views, taken today 6/6/2019. Images were reviewed in the office with the patient today and show minimal degenerative changes in the knee. There is chondrocalcinosis medially and laterally.      ASSESSMENT:   1. Chondrocalcinosis of left knee, possible medial meniscus tear   2. Prepatellar bursitis, left knee.    PLAN:   I discussed exam findings with patient. Symptoms could be related to a meniscus tear. I think his prepatellar bursal swelling is unrelated to his injury; in my opinion, it is more likely from repetitive kneeling.     Pain:   We talked about possible nest steps for the knee itself including steroid injections versus MRI. At this time, he is not interested as pain is not his biggest concern at this time. I also offered physical therapy, which he has declined today. Advised continued nonsteroidal anti-inflammatory drugs as needed.     Swelling:   We also discussed treatment for bursitis including possible aspiration and steroid injection. Also discussed more conservative treatments such as wrapping the knee, nonsteroidal anti-inflammatory drugs and ice. At this time, he would like to continue with at home treatments and will call if he would like an aspiration/injection in future.       Return to clinic: as needed     The information in this document, created by a scribe for me, accurately reflects the services I personally performed and the decisions made by me. I have reviewed and approved this document for accuracy.      TATI De Dios  MD  Dept. Orthopedic Surgery  [facility]           Again, thank you for allowing me to participate in the care of your patient.        Sincerely,        Bo De Dios MD

## 2019-07-01 ENCOUNTER — TELEPHONE (OUTPATIENT)
Dept: FAMILY MEDICINE | Facility: CLINIC | Age: 69
End: 2019-07-01

## 2019-07-01 DIAGNOSIS — M25.462 EFFUSION OF KNEE JOINT, LEFT: Primary | ICD-10-CM

## 2019-07-01 NOTE — TELEPHONE ENCOUNTER
Patient's wife is calling and is requesting an MRI for his left knee.  Please call to let patient know this can be ordered.  Thank you

## 2019-07-01 NOTE — TELEPHONE ENCOUNTER
Called pt and informed Dr. De Dios will be in the office on Tuesday the 2nd to order and sign the MRI and our office will contact them with the info.  April St Rocio ALBERTO 7/1/2019 2:56 PM

## 2019-07-02 NOTE — TELEPHONE ENCOUNTER
Called and informed pt he could get his mri and to return for a follow up up two days after to go over the results in the office.  Bruna Prabhakar CMA 7/2/2019 8:38 AM

## 2019-07-03 ENCOUNTER — ANCILLARY PROCEDURE (OUTPATIENT)
Dept: MRI IMAGING | Facility: CLINIC | Age: 69
End: 2019-07-03
Attending: ORTHOPAEDIC SURGERY
Payer: COMMERCIAL

## 2019-07-03 DIAGNOSIS — M25.462 EFFUSION OF KNEE JOINT, LEFT: ICD-10-CM

## 2019-07-03 PROCEDURE — 73721 MRI JNT OF LWR EXTRE W/O DYE: CPT | Mod: TC

## 2019-07-08 ENCOUNTER — TELEPHONE (OUTPATIENT)
Dept: ORTHOPEDICS | Facility: CLINIC | Age: 69
End: 2019-07-08

## 2019-07-08 NOTE — TELEPHONE ENCOUNTER
Reason for call:  Other   Patient called regarding (reason for call): call back  Additional comments: Patient is returning   call and is wondering what the call is regarding. Please call back and advise.   Phone number to reach patient:  Other phone number:    [phone]      Best Time:  Any     Can we leave a detailed message on this number?  YES

## 2019-07-08 NOTE — PROGRESS NOTES
"SUBJECTIVE:  Harvey Guevara returns for MRI results from 7/3/19 of the left knee, which are reviewed with the patient by myself and showed:    1. Large tear of the entire lateral meniscus and also the posterior  horn of the medial meniscus.  2. Small focus of moderate chondromalacia of the medial femoral  condyle with mild chondromalacia elsewhere in both the medial and  lateral compartments.  3. Extensive prepatellar bursitis.    Present Symptoms: Lateral knee pain going on one year. He states that the pain is not that severe, but it has been bothering him for a long time. He has also had \"water on the knee\" for several months. He tries to always wear knee pads whenever he has to kneel to do work.     Review of Systems:  Constitutional/General: Negative for fever, chills, change in weight  Integumentary/Skin: Negative for worrisome rashes, moles, or lesions  Neuro: Negative for weakness, dizziness, or paresthesias   Psychiatric: negative for changes in mood or affect    This document serves as a record of the services and decisions personally performed and made by Dr. TATI De Dios MD. It was created on his behalf by Gloria Berumen, a trained medical scribe. The creation of this record is based on the provider's personal observations and the statements of the patient.  Gloria Berumen July 9, 2019 8:48 AM    OBJECTIVE:  Physical Exam:  BP (!) 192/91 (BP Location: Left arm, Patient Position: Sitting, Cuff Size: Adult Regular)   Pulse 85   Ht 1.791 m (5' 10.5\")   Wt 90.7 kg (200 lb)   SpO2 98%   BMI 28.29 kg/m    General Appearance: healthy, alert and no distress   Skin: no suspicious lesions or rashes  Neuro: Normal strength and tone, mentation intact and speech normal  Vascular: good pulses, and cappillary refill   Lymph: no lymphadenopathy   Psych:  mentation appears normal and affect normal/bright  Resp: no increased work of breathing    Left Knee Exam:  Tender: Lateral joint line  Effusion: moderate effusion " anterior knee/prepatellar bursa      ASSESSMENT:   Encounter Diagnoses   Name Primary?     Prepatellar bursitis of left knee      Chondrocalcinosis      Other tear of medial meniscus of left knee, unspecified whether old or current tear, subsequent encounter      Other tear of lateral meniscus of left knee as current injury, subsequent encounter      Effusion of knee joint, left Yes     - Horizontal lateral and medial meniscus tears. It is possible that what appear to be tears could be the chondrocalcinosis seen on xrays.    PLAN:  Discussed findings with patient.  We talked about treatment options including steroid injections and knee arthroscopy. I have explained the nature of the procedure and the risks with the patient. Knee arthroscopy would be a last resort. He does not think that his pain is bad enough to warrant either injection or surgery yet, as he is able to manage his pain. Also discussed aspiration and steroid injection for the prepatellar bursa should conservative measures fail.   We discussed conservative measures: nsaids, wrapping, avoiding kneeling on that knee.    Return to clinic: as needed    Total time spent was 15 minutes; with 15 minutes spent face-to-face with patient dedicated to education, counseling and a development of a treatment plan.    The information in this document, created by the medical scribe for me, accurately reflects the services I personally performed and the decisions made by me. I have reviewed and approved this document for accuracy.     TATI De Dios MD  Dept. Orthopedic Surgery  [facility]

## 2019-07-09 ENCOUNTER — OFFICE VISIT (OUTPATIENT)
Dept: ORTHOPEDICS | Facility: CLINIC | Age: 69
End: 2019-07-09
Payer: COMMERCIAL

## 2019-07-09 VITALS
DIASTOLIC BLOOD PRESSURE: 91 MMHG | OXYGEN SATURATION: 98 % | HEIGHT: 71 IN | BODY MASS INDEX: 28 KG/M2 | SYSTOLIC BLOOD PRESSURE: 192 MMHG | HEART RATE: 85 BPM | WEIGHT: 200 LBS

## 2019-07-09 DIAGNOSIS — S83.282D OTHER TEAR OF LATERAL MENISCUS OF LEFT KNEE AS CURRENT INJURY, SUBSEQUENT ENCOUNTER: Primary | ICD-10-CM

## 2019-07-09 DIAGNOSIS — M70.42 PREPATELLAR BURSITIS OF LEFT KNEE: ICD-10-CM

## 2019-07-09 DIAGNOSIS — M11.20 CHONDROCALCINOSIS: ICD-10-CM

## 2019-07-09 DIAGNOSIS — S83.242D OTHER TEAR OF MEDIAL MENISCUS OF LEFT KNEE, UNSPECIFIED WHETHER OLD OR CURRENT TEAR, SUBSEQUENT ENCOUNTER: ICD-10-CM

## 2019-07-09 DIAGNOSIS — M25.462 EFFUSION OF KNEE JOINT, LEFT: ICD-10-CM

## 2019-07-09 PROCEDURE — 99213 OFFICE O/P EST LOW 20 MIN: CPT | Performed by: ORTHOPAEDIC SURGERY

## 2019-07-09 ASSESSMENT — MIFFLIN-ST. JEOR: SCORE: 1681.38

## 2019-07-09 NOTE — LETTER
"    7/9/2019         RE: Harvey Guevara  [address]  [place] [address]        Dear Colleague,    Thank you for referring your patient, Harvey Guevara, to the [facility]. Please see a copy of my visit note below.    SUBJECTIVE:  Harvey Guevara returns for MRI results from 7/3/19 of the left knee, which are reviewed with the patient by myself and showed:    1. Large tear of the entire lateral meniscus and also the posterior  horn of the medial meniscus.  2. Small focus of moderate chondromalacia of the medial femoral  condyle with mild chondromalacia elsewhere in both the medial and  lateral compartments.  3. Extensive prepatellar bursitis.    Present Symptoms: Lateral knee pain going on one year. He states that the pain is not that severe, but it has been bothering him for a long time. He has also had \"water on the knee\" for several months. He tries to always wear knee pads whenever he has to kneel to do work.     Review of Systems:  Constitutional/General: Negative for fever, chills, change in weight  Integumentary/Skin: Negative for worrisome rashes, moles, or lesions  Neuro: Negative for weakness, dizziness, or paresthesias   Psychiatric: negative for changes in mood or affect    This document serves as a record of the services and decisions personally performed and made by Dr. TATI De Dios MD. It was created on his behalf by Gloria Berumen, a trained medical scribe. The creation of this record is based on the provider's personal observations and the statements of the patient.  Gloria Berumen July 9, 2019 8:48 AM    OBJECTIVE:  Physical Exam:  BP (!) 192/91 (BP Location: Left arm, Patient Position: Sitting, Cuff Size: Adult Regular)   Pulse 85   Ht 1.791 m (5' 10.5\")   Wt 90.7 kg (200 lb)   SpO2 98%   BMI 28.29 kg/m     General Appearance: healthy, alert and no distress   Skin: no suspicious lesions or rashes  Neuro: Normal strength and tone, mentation intact and speech " normal  Vascular: good pulses, and cappillary refill   Lymph: no lymphadenopathy   Psych:  mentation appears normal and affect normal/bright  Resp: no increased work of breathing    Left Knee Exam:  Tender: Lateral joint line  Effusion: moderate effusion anterior knee/prepatellar bursa      ASSESSMENT:   Encounter Diagnoses   Name Primary?     Prepatellar bursitis of left knee      Chondrocalcinosis      Other tear of medial meniscus of left knee, unspecified whether old or current tear, subsequent encounter      Other tear of lateral meniscus of left knee as current injury, subsequent encounter      Effusion of knee joint, left Yes     - Horizontal lateral and medial meniscus tears. It is possible that what appear to be tears could be the chondrocalcinosis seen on xrays.    PLAN:  Discussed findings with patient.  We talked about treatment options including steroid injections and knee arthroscopy. I have explained the nature of the procedure and the risks with the patient. Knee arthroscopy would be a last resort. He does not think that his pain is bad enough to warrant either injection or surgery yet, as he is able to manage his pain. Also discussed aspiration and steroid injection for the prepatellar bursa should conservative measures fail.   We discussed conservative measures: nsaids, wrapping, avoiding kneeling on that knee.    Return to clinic: as needed    Total time spent was 15 minutes; with 15 minutes spent face-to-face with patient dedicated to education, counseling and a development of a treatment plan.    The information in this document, created by the medical scribe for me, accurately reflects the services I personally performed and the decisions made by me. I have reviewed and approved this document for accuracy.     TATI De Dios MD  Dept. Orthopedic Surgery  [facility]        Again, thank you for allowing me to participate in the care of your patient.        Sincerely,        Bo  Nagi De Dios MD

## 2019-07-09 NOTE — TELEPHONE ENCOUNTER
Called to give results pt is on his way in to his appt with Dr. De Dios @ 8:45am 07/09/19.  April St Chan [facility] 7/9/2019 8:24 AM

## 2019-09-09 ENCOUNTER — TELEPHONE (OUTPATIENT)
Dept: FAMILY MEDICINE | Facility: CLINIC | Age: 69
End: 2019-09-09

## 2019-09-09 DIAGNOSIS — B00.1 RECURRENT COLD SORES: ICD-10-CM

## 2019-09-09 RX ORDER — VALACYCLOVIR HYDROCHLORIDE 1 G/1
TABLET, FILM COATED ORAL
Qty: 24 TABLET | Refills: 1 | Status: SHIPPED | OUTPATIENT
Start: 2019-09-09 | End: 2020-11-10

## 2019-09-09 NOTE — TELEPHONE ENCOUNTER
Reason for Call:  Medication or medication refill:    Do you use a [facility]?  Name of the pharmacy and phone number for the current request:  [facility] [phone]    Name of the medication requested: valACYclovir (VALTREX) 1000 mg tablet    Other request:  Patient's wife would like a call back tomorrow so she can come pick it up.  Thank you    Can we leave a detailed message on this number? YES    Phone number patient can be reached at: Other phone number:  [phone]    Best Time: any    Call taken on 9/9/2019 at 4:08 PM by Priscilla Andrade

## 2019-09-09 NOTE — TELEPHONE ENCOUNTER
To alternate provider to advise on valacyclovir refill request for cold sores.  Originally prescribed in 2016.  Was mentioned 9/21/17 at OV/physical.  Sees Dr. José Lee yearly for his physicals  Requesting refill of med.  Last filled:  8/31/18  #24 with 1 refill

## 2019-11-14 ENCOUNTER — DOCUMENTATION ONLY (OUTPATIENT)
Dept: LAB | Facility: CLINIC | Age: 69
End: 2019-11-14

## 2019-11-14 DIAGNOSIS — I10 HYPERTENSION GOAL BP (BLOOD PRESSURE) < 150/90: ICD-10-CM

## 2019-11-14 DIAGNOSIS — Z00.00 ROUTINE HISTORY AND PHYSICAL EXAMINATION OF ADULT: ICD-10-CM

## 2019-11-14 DIAGNOSIS — E78.5 HYPERLIPIDEMIA WITH TARGET LDL LESS THAN 130: ICD-10-CM

## 2019-11-14 DIAGNOSIS — Z12.5 SCREENING PSA (PROSTATE SPECIFIC ANTIGEN): ICD-10-CM

## 2019-11-14 DIAGNOSIS — Z13.6 CARDIOVASCULAR SCREENING; LDL GOAL LESS THAN 160: Primary | ICD-10-CM

## 2019-11-14 NOTE — PROGRESS NOTES
Patient has an up coming pre visit lab appointment on 11.19.2019. Please review pended orders and place any additional future orders that may be needed.     Thank you,  Patricia Mcgraw MLT ([facility])

## 2019-11-19 DIAGNOSIS — I10 HYPERTENSION GOAL BP (BLOOD PRESSURE) < 150/90: ICD-10-CM

## 2019-11-19 DIAGNOSIS — Z12.5 SCREENING PSA (PROSTATE SPECIFIC ANTIGEN): ICD-10-CM

## 2019-11-19 DIAGNOSIS — Z00.00 ROUTINE HISTORY AND PHYSICAL EXAMINATION OF ADULT: ICD-10-CM

## 2019-11-19 DIAGNOSIS — E78.5 HYPERLIPIDEMIA WITH TARGET LDL LESS THAN 130: ICD-10-CM

## 2019-11-19 LAB
ALBUMIN SERPL-MCNC: 3.9 G/DL (ref 3.4–5)
ALP SERPL-CCNC: 63 U/L (ref 40–150)
ALT SERPL W P-5'-P-CCNC: 26 U/L (ref 0–70)
ANION GAP SERPL CALCULATED.3IONS-SCNC: 4 MMOL/L (ref 3–14)
AST SERPL W P-5'-P-CCNC: 16 U/L (ref 0–45)
BILIRUB SERPL-MCNC: 0.6 MG/DL (ref 0.2–1.3)
BUN SERPL-MCNC: 10 MG/DL (ref 7–30)
CALCIUM SERPL-MCNC: 9.1 MG/DL (ref 8.5–10.1)
CHLORIDE SERPL-SCNC: 104 MMOL/L (ref 94–109)
CHOLEST SERPL-MCNC: 210 MG/DL
CO2 SERPL-SCNC: 31 MMOL/L (ref 20–32)
CREAT SERPL-MCNC: 0.9 MG/DL (ref 0.66–1.25)
GFR SERPL CREATININE-BSD FRML MDRD: 85 ML/MIN/{1.73_M2}
GLUCOSE SERPL-MCNC: 101 MG/DL (ref 70–99)
HDLC SERPL-MCNC: 69 MG/DL
LDLC SERPL CALC-MCNC: 111 MG/DL
NONHDLC SERPL-MCNC: 141 MG/DL
POTASSIUM SERPL-SCNC: 4.6 MMOL/L (ref 3.4–5.3)
PROT SERPL-MCNC: 6.9 G/DL (ref 6.8–8.8)
PSA SERPL-ACNC: 1.06 UG/L (ref 0–4)
SODIUM SERPL-SCNC: 139 MMOL/L (ref 133–144)
TRIGL SERPL-MCNC: 151 MG/DL

## 2019-11-19 PROCEDURE — G0103 PSA SCREENING: HCPCS | Performed by: FAMILY MEDICINE

## 2019-11-19 PROCEDURE — 36415 COLL VENOUS BLD VENIPUNCTURE: CPT | Performed by: FAMILY MEDICINE

## 2019-11-19 PROCEDURE — 80061 LIPID PANEL: CPT | Performed by: FAMILY MEDICINE

## 2019-11-19 PROCEDURE — 80053 COMPREHEN METABOLIC PANEL: CPT | Performed by: FAMILY MEDICINE

## 2019-11-26 ENCOUNTER — TELEPHONE (OUTPATIENT)
Dept: FAMILY MEDICINE | Facility: CLINIC | Age: 69
End: 2019-11-26

## 2019-11-26 ENCOUNTER — OFFICE VISIT (OUTPATIENT)
Dept: FAMILY MEDICINE | Facility: CLINIC | Age: 69
End: 2019-11-26
Payer: COMMERCIAL

## 2019-11-26 VITALS
RESPIRATION RATE: 18 BRPM | HEIGHT: 71 IN | SYSTOLIC BLOOD PRESSURE: 160 MMHG | DIASTOLIC BLOOD PRESSURE: 82 MMHG | TEMPERATURE: 97.7 F | BODY MASS INDEX: 28.14 KG/M2 | WEIGHT: 201 LBS | HEART RATE: 59 BPM

## 2019-11-26 DIAGNOSIS — E78.5 HYPERLIPIDEMIA WITH TARGET LDL LESS THAN 130: ICD-10-CM

## 2019-11-26 DIAGNOSIS — I10 HYPERTENSION GOAL BP (BLOOD PRESSURE) < 150/90: ICD-10-CM

## 2019-11-26 DIAGNOSIS — Z00.00 MEDICARE ANNUAL WELLNESS VISIT, SUBSEQUENT: Primary | ICD-10-CM

## 2019-11-26 PROCEDURE — 99397 PER PM REEVAL EST PAT 65+ YR: CPT | Performed by: FAMILY MEDICINE

## 2019-11-26 RX ORDER — HYDROCHLOROTHIAZIDE 25 MG/1
25 TABLET ORAL DAILY
Qty: 30 TABLET | Refills: 1 | Status: SHIPPED | OUTPATIENT
Start: 2019-11-26 | End: 2019-12-17 | Stop reason: DRUGHIGH

## 2019-11-26 ASSESSMENT — ENCOUNTER SYMPTOMS: JOINT SWELLING: 0

## 2019-11-26 ASSESSMENT — ACTIVITIES OF DAILY LIVING (ADL): CURRENT_FUNCTION: NO ASSISTANCE NEEDED

## 2019-11-26 ASSESSMENT — MIFFLIN-ST. JEOR: SCORE: 1685.92

## 2019-11-26 NOTE — NURSING NOTE
"Chief Complaint   Patient presents with     Medicare Visit       Initial BP (!) 170/89   Pulse 59   Temp 97.7  F (36.5  C) (Oral)   Resp 18   Ht 1.791 m (5' 10.5\")   Wt 91.2 kg (201 lb)   BMI 28.43 kg/m   Estimated body mass index is 28.43 kg/m  as calculated from the following:    Height as of this encounter: 1.791 m (5' 10.5\").    Weight as of this encounter: 91.2 kg (201 lb).    Mimi Light, CMA    "

## 2019-11-26 NOTE — PROGRESS NOTES
"SUBJECTIVE:   Harvey Guevara is a 69 year old male who presents for Preventive Visit.    History htn, high cholesterol and ed.  No memory concerns per family. Has blood pressure cuff - needs to start checking blood pressure. No chest pain or shortness of breath. Exercise reguarly. No nausea, vomiting or diarrhea or black/bloody stools. No urine changes/hematuria. Emotionally doing ok. Sleep ok. No major snoring. Cold sores ok/nasal congestion ok.  Are you in the first 12 months of your Medicare coverage?  No    Healthy Habits:     In general, how would you rate your overall health?  Good    Frequency of exercise:  4-5 days/week    Duration of exercise:  Greater than 60 minutes    Do you usually eat at least 4 servings of fruit and vegetables a day, include whole grains    & fiber and avoid regularly eating high fat or \"junk\" foods?  No    Taking medications regularly:  Yes    Medication side effects:  None    Ability to successfully perform activities of daily living:  No assistance needed    Home Safety:  No safety concerns identified    Hearing Impairment:  No hearing concerns    In the past 6 months, have you been bothered by leaking of urine?  No    In general, how would you rate your overall mental or emotional health?  Good      PHQ-2 Total Score: 0    Additional concerns today:  No    Do you feel safe in your environment? Yes    Have you ever done Advance Care Planning? (For example, a Health Directive, POLST, or a discussion with a medical provider or your loved ones about your wishes): Yes, patient states has an Advance Care Planning document and will bring a copy to the clinic.       Fall risk       Cognitive Screening   1) Repeat 3 items (Leader, Season, Table)    2) Clock draw: NORMAL  3) 3 item recall:   Recalls 3 objects  Results: 3 items recalled: COGNITIVE IMPAIRMENT LESS LIKELY    Mini-CogTM Copyright [employer]. Licensed by the author for use in [facility]; reprinted with permission " "(hseree@[facility]). All rights reserved.      Do you have sleep apnea, excessive snoring or daytime drowsiness?: no    Reviewed and updated as needed this visit by clinical staff         Reviewed and updated as needed this visit by Provider        Social History     Tobacco Use     Smoking status: Former Smoker     Smokeless tobacco: Never Used     Tobacco comment: Lives in smoke free household   Substance Use Topics     Alcohol use: Yes     Alcohol/week: 0.0 standard drinks     Comment: 1-2 beers at night          Alcohol Use 11/26/2019   Prescreen: >3 drinks/day or >7 drinks/week? No   Prescreen: >3 drinks/day or >7 drinks/week? -           PROBLEMS TO ADD ON...    Current providers sharing in care for this patient include:   Patient Care Team:  José Lee MD as PCP - General (Family Practice)  Jerry Kaba MD as Assigned PCP    The following health maintenance items are reviewed in Epic and correct as of today:  Health Maintenance   Topic Date Due     ZOSTER IMMUNIZATION (2 of 3) 12/02/2014     PHQ-2  01/01/2019     ADVANCE CARE PLANNING  07/22/2019     MEDICARE ANNUAL WELLNESS VISIT  10/11/2019     EYE EXAM  03/07/2020     FALL RISK ASSESSMENT  03/12/2020     LIPID  11/19/2024     COLONOSCOPY  03/14/2028     DTAP/TDAP/TD IMMUNIZATION (3 - Td) 07/16/2028     HEPATITIS C SCREENING  Completed     INFLUENZA VACCINE  Completed     PNEUMOCOCCAL IMMUNIZATION 65+ LOW/MEDIUM RISK  Completed     AORTIC ANEURYSM SCREENING (SYSTEM ASSIGNED)  Completed     IPV IMMUNIZATION  Aged Out     MENINGITIS IMMUNIZATION  Aged Out     Lab work is in process      Review of Systems   Genitourinary: Positive for impotence.   Musculoskeletal: Negative for joint swelling.     All other ROS negative.    OBJECTIVE:   There were no vitals taken for this visit. Estimated body mass index is 28.29 kg/m  as calculated from the following:    Height as of 7/9/19: 1.791 m (5' 10.5\").    Weight as of 7/9/19: 90.7 kg (200 lb).   BP (!) " "160/82   Pulse 59   Temp 97.7  F (36.5  C) (Oral)   Resp 18   Ht 1.791 m (5' 10.5\")   Wt 91.2 kg (201 lb)   BMI 28.43 kg/m      Physical Exam  GENERAL: healthy, alert and no distress  EYES: Eyes grossly normal to inspection, PERRL and conjunctivae and sclerae normal  HENT: ear canals and TM's normal, nose and mouth without ulcers or lesions  NECK: no adenopathy, no asymmetry, masses, or scars and thyroid normal to palpation  RESP: lungs clear to auscultation - no rales, rhonchi or wheezes  BREAST: normal without masses, tenderness or nipple discharge and no palpable axillary masses or adenopathy  CV: regular rate and rhythm, normal S1 S2, no S3 or S4, no murmur, click or rub, no peripheral edema and peripheral pulses strong  ABDOMEN: soft, nontender, no hepatosplenomegaly, no masses and bowel sounds normal   (male): patient deferred /rectal exams.   MS: no gross musculoskeletal defects noted, no edema  SKIN: no suspicious lesions or rashes  NEURO: Normal strength and tone, mentation intact and speech normal  PSYCH: mentation appears normal, affect normal/bright  LYMPH: no cervical, supraclavicular, axillary, or inguinal adenopathy    Diagnostic Test Results:  Labs reviewed in Epic    ASSESSMENT / PLAN:   ASSESSMENT / PLAN:  (Z00.00) Medicare annual wellness visit, subsequent  (primary encounter diagnosis)  Comment: generally healthy and normal exam/labs  Plan: Reviewed self mole/testicle check handout.      (I10) Hypertension goal BP (blood pressure) < 150/90  Comment: needs help  Plan: hydrochlorothiazide (HYDRODIURIL) 25 MG tablet,        RN HTN MGMT, Renal panel        Add hydrochlorothiazide. Combo pill if ok. Follow-up RN HTN in 2-3 week and recheck renal panel at time. norvasc if still high. Chest pain or shortness of breath to er. Continue exercise and self-monitor.     (E78.5) Hyperlipidemia with target LDL less than 130  Comment: stable  Plan: lower simple carbs. Continue " "zocor.        COUNSELING:  Reviewed preventive health counseling, as reflected in patient instructions       Regular exercise       Healthy diet/nutrition       Vision screening       Hearing screening       Dental care       Fall risk prevention       Colon cancer screening       Prostate cancer screening    Estimated body mass index is 28.29 kg/m  as calculated from the following:    Height as of 7/9/19: 1.791 m (5' 10.5\").    Weight as of 7/9/19: 90.7 kg (200 lb).         reports that he has quit smoking. He has never used smokeless tobacco.      Appropriate preventive services were discussed with this patient, including applicable screening as appropriate for cardiovascular disease, diabetes, osteopenia/osteoporosis, and glaucoma.  As appropriate for age/gender, discussed screening for colorectal cancer, prostate cancer, breast cancer, and cervical cancer. Checklist reviewing preventive services available has been given to the patient.    Reviewed patients plan of care and provided an AVS. The Intermediate Care Plan ( asthma action plan, low back pain action plan, and migraine action plan) for Harvey meets the Care Plan requirement. This Care Plan has been established and reviewed with the Patient.    Counseling Resources:  ATP IV Guidelines  Pooled Cohorts Equation Calculator  Breast Cancer Risk Calculator  FRAX Risk Assessment  ICSI Preventive Guidelines  Dietary Guidelines for Americans, 2010  My[employer]'s MyPlate  ASA Prophylaxis  Lung CA Screening    José Lee MD  [facility]    Identified Health Risks:  "

## 2019-11-26 NOTE — TELEPHONE ENCOUNTER
José Lee MD [facility]             Needs htn rn visit and non-fasting lab at visit in 2-3 weeks. Thanks.

## 2019-12-06 ENCOUNTER — TELEPHONE (OUTPATIENT)
Dept: FAMILY MEDICINE | Facility: CLINIC | Age: 69
End: 2019-12-06

## 2019-12-06 NOTE — TELEPHONE ENCOUNTER
: Please notify pt via letter or via phone to assist with appointment scheduling 60-min Initial RN HTN visit.    On 11/26/19 Dr. Lee placed an referral/order for RN HTN MGMT. See 11/26/19 order.  Per 11/26/19 office visit note this is to be scheduled 2-3 weeks after the completed 11/26/19 visit.    NAKIA SuN, RN

## 2019-12-06 NOTE — LETTER
Harvey Guevara  [address]  [place] [address]        December 6, 2019        Dear Harvey,      Our records indicate that you have not scheduled for a(n)RN hypertension management appointment which was recommended by your health care team. Monitoring and managing your preventative and chronic health conditions are very important to us.       If you have received your health care elsewhere, please provide us with that information so it can be documented in your chart.    Please call [phone] or message us through your [employer] account to schedule an appointment or provide information for your chart.     We look forward to seeing you and working with you on your health care needs.     Sincerely,   José Lee MD/          *If you have already scheduled an appointment, please disregard this reminder

## 2019-12-17 ENCOUNTER — ALLIED HEALTH/NURSE VISIT (OUTPATIENT)
Dept: NURSING | Facility: CLINIC | Age: 69
End: 2019-12-17
Payer: COMMERCIAL

## 2019-12-17 VITALS — SYSTOLIC BLOOD PRESSURE: 148 MMHG | DIASTOLIC BLOOD PRESSURE: 75 MMHG | HEART RATE: 54 BPM

## 2019-12-17 DIAGNOSIS — E78.5 HYPERLIPIDEMIA WITH TARGET LDL LESS THAN 130: ICD-10-CM

## 2019-12-17 DIAGNOSIS — I10 HYPERTENSION GOAL BP (BLOOD PRESSURE) < 150/90: ICD-10-CM

## 2019-12-17 DIAGNOSIS — I10 HYPERTENSION GOAL BP (BLOOD PRESSURE) < 150/90: Primary | ICD-10-CM

## 2019-12-17 LAB
ALBUMIN SERPL-MCNC: 3.9 G/DL (ref 3.4–5)
ANION GAP SERPL CALCULATED.3IONS-SCNC: 6 MMOL/L (ref 3–14)
BUN SERPL-MCNC: 11 MG/DL (ref 7–30)
CALCIUM SERPL-MCNC: 9.3 MG/DL (ref 8.5–10.1)
CHLORIDE SERPL-SCNC: 100 MMOL/L (ref 94–109)
CO2 SERPL-SCNC: 30 MMOL/L (ref 20–32)
CREAT SERPL-MCNC: 0.88 MG/DL (ref 0.66–1.25)
GFR SERPL CREATININE-BSD FRML MDRD: 87 ML/MIN/{1.73_M2}
GLUCOSE SERPL-MCNC: 97 MG/DL (ref 70–99)
PHOSPHATE SERPL-MCNC: 3.3 MG/DL (ref 2.5–4.5)
POTASSIUM SERPL-SCNC: 3.9 MMOL/L (ref 3.4–5.3)
SODIUM SERPL-SCNC: 136 MMOL/L (ref 133–144)

## 2019-12-17 PROCEDURE — 36415 COLL VENOUS BLD VENIPUNCTURE: CPT | Performed by: FAMILY MEDICINE

## 2019-12-17 PROCEDURE — 80069 RENAL FUNCTION PANEL: CPT | Performed by: FAMILY MEDICINE

## 2019-12-17 PROCEDURE — 99207 ZZC NO CHARGE NURSE ONLY: CPT

## 2019-12-17 NOTE — PROGRESS NOTES
"BP: 148/75  Pulse:54.  Dr. Mer Medrano to advise on any medication changes; RN unable to determine treatment plan from information on RN HTN orders.  He is requesting medication be sent to [facility].  Would prefer med be the \"combo med\" as per discussed prior with Dr. Mer Medrano (am thinking plan is to combine hydrochlorothiazide and lisinopril but RN unsure if is to leave at current dose and add norvasc 5mg or if increasing the lisinopril or hctz)  OK to leave detailed message re prescription's and when to be seen again on his voicemail [phone].  Will need bp meds filled this week; almost out.    Harvey MITCHELL Brittneeanupama is being followed for Blood Pressure management.    NURSING ASSESSMENT/PLAN:  Blood pressure reading today is not at the provider specified goal of <140/90.    Current blood pressure medication(s):  Lisinopril 20mg q day, hydrochlorothiazide 25mg q day  1.  The patient will continue current medication regimen unchanged.  WILL FORWARD TO DR. MER MEDRANO FOR ADVISEMENT; HTN ORDERS ARE NOT CLEAR.   2.  We will be checking a RENAL panel today. DR. MER MEDRANO HAD already placed orders and patient did labwork prior to this appt   3.  Follow up instructions include:     Dr. Mer Medrano to advise on med changes then route to team RN to call patient with any changes and to schedule follow up appointment.   # [phone]  Ok to leave detailed message.    SUBJECTIVE:                                                    The patient is taking medication as prescribed and is tolerating well. States is having no side effects from med.  Patient is monitoring Blood Pressure at home.   Last 3 home readings 135/72 P:53,  130/63 P: 55, 125/71 P: 65.    In addition notes: checked his home bp monitor immediately after checking it in clinic.  His appears to be reading 8-10 higher; done x2.      Out of the following complicating factors: Cough, Headache, Lightheadedness, Shortness of breath, Fatigue, Nausea, " Sexual Dysfunction, New onset of swelling or edema, Weakness and New onset of Chest Pain, the patient reports:  None    OBJECTIVE:                                                    Is pulse 55 or greater? - NO. pulse was 55 and 54.    Pulse Readings from Last 1 Encounters:   11/26/19 59     Today's BP completed using cuff size: X-large on both sides arm.  BP Readings from Last 3 Encounters:   11/26/19 (!) 160/82   07/09/19 (!) 192/91   06/06/19 169/83       Current Outpatient Medications   Medication Sig Dispense Refill     aspirin 81 MG tablet Take 81 mg by mouth daily       fluticasone (FLONASE) 50 MCG/ACT nasal spray Spray 2 sprays into both nostrils daily as needed for rhinitis or allergies 1 Package 0     hydrochlorothiazide (HYDRODIURIL) 25 MG tablet Take 1 tablet (25 mg) by mouth daily New for blood pressure in AM 30 tablet 1     lisinopril (PRINIVIL/ZESTRIL) 20 MG tablet Take 1 tablet (20 mg) by mouth daily For blood pressure Pharmacy ok to hold prescription until due 90 tablet 3     Multiple Vitamins-Minerals (CENTRUM SILVER) per tablet Take 1 tablet by mouth daily       sildenafil (REVATIO) 20 MG tablet 2-4 pills daily as needed for ED Never use with nitroglycerin, terazosin or doxazosin. 30 tablet 1     simvastatin (ZOCOR) 40 MG tablet Take 1 tablet (40 mg) by mouth At Bedtime For cholesterol Pharmacy ok to hold prescription until due 90 tablet 3     triamcinolone (KENALOG) 0.5 % cream Apply sparingly to affected area twice daily to leg rash up to 10 days as needed 90 g 1     valACYclovir (VALTREX) 1000 mg tablet TAKE TWO TABLETS BY MOUTH TWO TIMES A DAY FOR 1 DAY AS NEEDED FOR COLD SORE 24 tablet 1     Potassium   Date Value Ref Range Status   11/19/2019 4.6 3.4 - 5.3 mmol/L Final     Creatinine   Date Value Ref Range Status   11/19/2019 0.90 0.66 - 1.25 mg/dL Final     Urea Nitrogen   Date Value Ref Range Status   11/19/2019 10 7 - 30 mg/dL Final     GFR Estimate   Date Value Ref Range Status    11/19/2019 85 >60 mL/min/[1.73_m2] Final     Comment:     Non  GFR Calc  Starting 12/18/2018, serum creatinine based estimated GFR (eGFR) will be   calculated using the Chronic Kidney Disease Epidemiology Collaboration   (CKD-EPI) equation.        A baseline potassium, creatinine, BUN, GFR has been done within past 12 months    Education:  written materials handout and general discussion/verbal explanation  Limit dietary sodium intake between 1500-2000mg every day  Regular aerobic exercise.  Discussed habit change regarding diet/weight loss and disease management/lifestyle changes reading food labels; lean meats/proteins, fruits and vegetables.  decreasing sugars, sodium, processed foods.  These interventions were used: Reflection- simple or complex, Empathy/Understanding, Permission to raise concern or advise and Open ended questions  Education material provided and patient was given an opportunity to ask questions.    Patient verbalized understanding of this plan and is agreeable.    Professional Reference click here   Copy of current RN HTN MGMT order or refer to Other Orders:  Add blood pressure goal to problem list:  <140/90    Patient is being referred to RN Hypertension Program for the following diagnoses:  Hypertension    RN will confirm Potassium, Creatinine, BUN (or BMP) have been done within the past 12 months.  RN will order follow-up labs according to RN protocol.      According to RN Protocol, start or titrate:     Add lisinopril and hydrochlorothiazide to combo pill if tolerates. Add norvasc 5mg daily if not at goal. Follow-up md appointment if still high after that.     If blood pressure not at goal after current medication titrated, the following medication(s) may be prescribed and titrated.        If blood pressure not at goal after maximum dose reached, consult provider.    If blood pressure at goal, follow up: with PCP 6 mos. from last PCP appt.   Dosage adjustment made based on  patient specific, physician directed, care plan.  Bita Page, RN, RN

## 2019-12-17 NOTE — PATIENT INSTRUCTIONS
PATIENT INSTRUCTIONS     1.  You will continue current medications; MD office to contact you this week with medication changes if MD is doing so    2.  FOLLOW UP: MD to advise      3.  Limit total daily sodium to 1500-2000mg per day    Your BP Goal is: less than <140/90 consistently    Today we used a X-large cuff on your bilateral arm.    BP Readings from Last 3 Encounters:   19 (!) 148/75   19 (!) 160/82   19 (!) 192/91     Pulse Readings from Last 1 Encounters:   19 54       You will need baseline lab tests done within 12 months of beginning a new blood pressure medication and during/after medication adjustment is complete.    Last Labs:  Sodium   Date Value Ref Range Status   2019 139 133 - 144 mmol/L Final      Potassium   Date Value Ref Range Status   2019 4.6 3.4 - 5.3 mmol/L Final     Urea Nitrogen   Date Value Ref Range Status   2019 10 7 - 30 mg/dL Final     Creatinine   Date Value Ref Range Status   2019 0.90 0.66 - 1.25 mg/dL Final     GFR Estimate   Date Value Ref Range Status   2019 85 >60 mL/min/[1.73_m2] Final     Comment:     Non  GFR Calc  Starting 2018, serum creatinine based estimated GFR (eGFR) will be   calculated using the Chronic Kidney Disease Epidemiology Collaboration   (CKD-EPI) equation.         Today we talked about...     The key to effective blood pressure monitorin) Take your BP medication in the morning or as directed.     2) Sit in a chair with feet flat on the floor for 5-10 minutes before checking your blood pressure.     3) Use the same arm every time.       4) Use the same machine and appropriate cuff size.       5) Check your blood pressure at the same time of day.    High Blood Pressure      Over time, high blood pressure can damage the blood vessels and vital organs. This can lead to strokes, heart disease, and kidney disease. Treating high blood pressure decreases your chances of having  heart and kidney problems.  A healthy diet low in sodium and high in grains along with regular exercise have a direct effect on your blood pressure.      Several kinds of medicines are used to treat high blood pressure. The medicines may be effective alone, or they may be used with other drugs.     How do high blood pressure medicines work?   Each type of medicine lowers blood pressure in a different way.     Diuretics: also called water pills, rid the body of excess sodium (salt) and water. This means there is less blood volume putting pressure on the vessel walls.   Examples include: hydrochlorothiazide or furosemide (Lasix).     Beta blockers: reduce the heart rate and the heart's output of blood.  Slowing the heart down a bit allows it to fill more completely in between beats.  Examples include: metoprolol, atenolol, and propranolol.     Vasodilators, ACE inhibitors, ARBs, and calcium channel blockers: lower blood pressure by relaxing and opening up narrowed blood vessels allowing more room for blood to flow.   Examples:  -Calcium Channel Blockers: diltiazem, verapamil, nifedipine, and amlodipine .     -ACE inhibitors (angiotensin-converting enzyme inhibitor): enalapril, captopril and lisinopril.     -ARBs (angiotensin receptor blockers)- irbesartan, valsartan and losartan.      -Vasodilators- nitroglycerin, isosorbide mononitrate, and hydralazine.     What should I watch out for while taking high blood pressure medicines?   When you take high blood pressure medicine, it is important to:     Take the medicine regularly, exactly as prescribed. Do not change your dosage or stop taking the medicine without talking to your provider first. It can be dangerous to suddenly stop taking blood pressure medicine.     Tell your provider about any side effects right away. You may feel dizzy or have headaches while taking these medicines. Older people may be more sensitive than younger people to the medicine's effects. It may  take several weeks or months to find the best treatment for you. Make sure that you keep your follow-up appointments with your healthcare provider and let your provider know how you are tolerating your medicine.     Check your blood pressure (or have it checked) as often as your healthcare provider advises. Ask your provider if you should have a home blood pressure monitor to check your blood pressure between visits.    Does everyone need to take medication to control high blood pressure?  No.  Some patients with elevated BP readings are able to lower their blood pressure by modifying diet and increasing activity level.    Your pharmacist is a great resource for questions regarding your medication's side effects and potential interactions.     If you are having a side effect from your medication, please contact your primary provider.     If you believe you are experiencing a life threatening reaction to your medication call 911 immediately.       Bita Page, RN, RN

## 2019-12-17 NOTE — Clinical Note
"BP: 148/75  Pulse:54.  Dr. José Lee to advise on any medication changes; RN unable to determine treatment plan from information on RN HTN orders.  He is requesting medication be sent to [facility].  Would prefer med be the \"combo med\" as per discussed prior with Dr. José Lee (am thinking plan is to combine hydrochlorothiazide and lisinopril but RN unsure if is to leave at current dose and add norvasc 5mg or if increasing the lisinopril or hctz)  OK to leave detailed message re prescription's and when to be seen again on his voicemail [phone].  Will need bp meds filled this week; almost out."

## 2019-12-18 ENCOUNTER — TELEPHONE (OUTPATIENT)
Dept: FAMILY MEDICINE | Facility: CLINIC | Age: 69
End: 2019-12-18

## 2019-12-18 DIAGNOSIS — I10 HYPERTENSION GOAL BP (BLOOD PRESSURE) < 150/90: ICD-10-CM

## 2019-12-18 DIAGNOSIS — E78.5 HYPERLIPIDEMIA WITH TARGET LDL LESS THAN 130: ICD-10-CM

## 2019-12-18 RX ORDER — LISINOPRIL AND HYDROCHLOROTHIAZIDE 20; 25 MG/1; MG/1
1 TABLET ORAL
Qty: 180 TABLET | Refills: 0 | Status: SHIPPED | OUTPATIENT
Start: 2019-12-18 | End: 2019-12-18

## 2019-12-18 RX ORDER — LISINOPRIL AND HYDROCHLOROTHIAZIDE 20; 25 MG/1; MG/1
1 TABLET ORAL
Qty: 180 TABLET | Refills: 0 | Status: SHIPPED | OUTPATIENT
Start: 2019-12-18 | End: 2020-03-17

## 2019-12-18 RX ORDER — SIMVASTATIN 40 MG
TABLET ORAL
Qty: 90 TABLET | Refills: 3 | Status: SHIPPED | OUTPATIENT
Start: 2019-12-18 | End: 2021-03-03

## 2019-12-18 RX ORDER — LISINOPRIL 20 MG/1
TABLET ORAL
Qty: 1 TABLET | Refills: 0 | COMMUNITY
Start: 2019-12-18 | End: 2019-12-17 | Stop reason: DRUGHIGH

## 2019-12-18 RX ORDER — SIMVASTATIN 40 MG
40 TABLET ORAL AT BEDTIME
Qty: 90 TABLET | Refills: 0 | Status: SHIPPED | OUTPATIENT
Start: 2019-12-18 | End: 2019-12-18

## 2019-12-18 NOTE — TELEPHONE ENCOUNTER
Refill request received within 30 days of last office visit with pcp.  Prescription is routed to the provider to please address refill.   Huyen Owen RN

## 2019-12-18 NOTE — PROGRESS NOTES
Patient informed of provider note and instructed to take lisinopril and hydrochlorothiazide twice daily, recheck blood pressure with nurse in 2 weeks, appointment 1/6/20    Pearl YEEN, RN, CPN

## 2020-01-15 ENCOUNTER — ALLIED HEALTH/NURSE VISIT (OUTPATIENT)
Dept: NURSING | Facility: CLINIC | Age: 70
End: 2020-01-15
Payer: COMMERCIAL

## 2020-01-15 VITALS — HEART RATE: 58 BPM | SYSTOLIC BLOOD PRESSURE: 135 MMHG | DIASTOLIC BLOOD PRESSURE: 72 MMHG

## 2020-01-15 DIAGNOSIS — I10 ESSENTIAL HYPERTENSION WITH GOAL BLOOD PRESSURE LESS THAN 140/90: Primary | ICD-10-CM

## 2020-01-15 PROCEDURE — 99207 ZZC NO CHARGE NURSE ONLY: CPT

## 2020-01-15 ASSESSMENT — PAIN SCALES - GENERAL: PAINLEVEL: NO PAIN (0)

## 2020-01-15 NOTE — PROGRESS NOTES
Harvey Guevara is being followed for Blood Pressure management.    NURSING ASSESSMENT/PLAN:  Blood pressure reading today is at the provider specified goal of <140/90.    Current blood pressure medication(s):  Lisinopril-hydrochlorothiazide 20-25mg; one tab by mouth bid  1.  The patient will continue current medication regimen unchanged.     2.  We will not be checking a metabolic lab panel today.  Renal panel done in Dec.    3.  Follow up instructions include:     Next Provider visit: Follow up in 6 months..    SUBJECTIVE:                                                    The patient is taking medication as prescribed and is tolerating well. He states no symptoms.  No dizziness, lightheadedness.  Does urinate more frequently but states is not a concern to him.  Patient is monitoring Blood Pressure at home.   Last 3 home readings 121/66, 119/65,133/61  In addition notes: Pulse at home running mid 50's to 70.    Out of the following complicating factors: Cough, Headache, Lightheadedness, Shortness of breath, Fatigue, Nausea, Sexual Dysfunction, New onset of swelling or edema, Weakness and New onset of Chest Pain, the patient reports:  None    OBJECTIVE:                                                    Is pulse 55 or greater? - Yes.  Pulse today is 58  Pulse Readings from Last 1 Encounters:   12/17/19 54     Today's BP completed using cuff size: regular on right side arm.  BP Readings from Last 3 Encounters:   12/17/19 (!) 148/75   11/26/19 (!) 160/82   07/09/19 (!) 192/91       Current Outpatient Medications   Medication Sig Dispense Refill     aspirin 81 MG tablet Take 81 mg by mouth daily       fluticasone (FLONASE) 50 MCG/ACT nasal spray Spray 2 sprays into both nostrils daily as needed for rhinitis or allergies 1 Package 0     lisinopril-hydrochlorothiazide (PRINZIDE/ZESTORETIC) 20-25 MG tablet Take 1 tablet by mouth 2 times daily for blood pressure (stop previous individual pills) 180 tablet 0     Multiple  Vitamins-Minerals (CENTRUM SILVER) per tablet Take 1 tablet by mouth daily       sildenafil (REVATIO) 20 MG tablet 2-4 pills daily as needed for ED Never use with nitroglycerin, terazosin or doxazosin. 30 tablet 1     simvastatin (ZOCOR) 40 MG tablet TAKE ONE TABLET BY MOUTH EVERY NIGHT AT BEDTIME FOR CHOLESTEROL 90 tablet 3     triamcinolone (KENALOG) 0.5 % cream Apply sparingly to affected area twice daily to leg rash up to 10 days as needed 90 g 1     valACYclovir (VALTREX) 1000 mg tablet TAKE TWO TABLETS BY MOUTH TWO TIMES A DAY FOR 1 DAY AS NEEDED FOR COLD SORE 24 tablet 1     Potassium   Date Value Ref Range Status   12/17/2019 3.9 3.4 - 5.3 mmol/L Final     Creatinine   Date Value Ref Range Status   12/17/2019 0.88 0.66 - 1.25 mg/dL Final     Urea Nitrogen   Date Value Ref Range Status   12/17/2019 11 7 - 30 mg/dL Final     GFR Estimate   Date Value Ref Range Status   12/17/2019 87 >60 mL/min/[1.73_m2] Final     Comment:     Non  GFR Calc  Starting 12/18/2018, serum creatinine based estimated GFR (eGFR) will be   calculated using the Chronic Kidney Disease Epidemiology Collaboration   (CKD-EPI) equation.        A baseline potassium, creatinine, BUN, GFR has been done within past 12 months    Education:  Positive encouragement on taking his medications regularly and monitoring his sodium intake.  Limit dietary sodium intake between 1500-2000mg every day  Regular aerobic exercise.      Professional Reference click here   Copy of current RN HTN MGMT order or refer to Other Orders:Add blood pressure goal to problem list:  <140/90    Patient is being referred to RN Hypertension Program for the following diagnoses:  Hypertension    RN will confirm Potassium, Creatinine, BUN (or BMP) have been done within the past 12 months.  RN will order follow-up labs according to RN protocol.      According to RN Protocol, start or titrate:     Add lisinopril and hydrochlorothiazide to combo pill if tolerates.  Add norvasc 5mg daily if not at goal. Follow-up md appointment if still high after that.     If blood pressure not at goal after current medication titrated, the following medication(s) may be prescribed and titrated.        If blood pressure not at goal after maximum dose reached, consult provider.    If blood pressure at goal, follow up: with PCP 6 mos. from last PCP appt.     Contraindications and dosing considerations for current and recommended medications have been reviewed. Yes    Dosage adjustment made based on patient specific, physician directed, care plan.  Bita Page RN, RN

## 2020-01-15 NOTE — PATIENT INSTRUCTIONS
PATIENT INSTRUCTIONS     1.  You will continue current medication regimen unchanged    2.  FOLLOW UP:   Follow up with Provider - in 6 months     3.  Limit total daily sodium to 1500-2000mg per day    Your BP Goal is: less than <140/90 consistently    Today we used a regular cuff on your right arm.    BP Readings from Last 3 Encounters:   01/15/20 135/72   19 (!) 148/75   19 (!) 160/82     Pulse Readings from Last 1 Encounters:   01/15/20 58       You will need baseline lab tests done within 12 months of beginning a new blood pressure medication and during/after medication adjustment is complete.    Last Labs:  Sodium   Date Value Ref Range Status   2019 136 133 - 144 mmol/L Final      Potassium   Date Value Ref Range Status   2019 3.9 3.4 - 5.3 mmol/L Final     Urea Nitrogen   Date Value Ref Range Status   2019 11 7 - 30 mg/dL Final     Creatinine   Date Value Ref Range Status   2019 0.88 0.66 - 1.25 mg/dL Final     GFR Estimate   Date Value Ref Range Status   2019 87 >60 mL/min/[1.73_m2] Final     Comment:     Non  GFR Calc  Starting 2018, serum creatinine based estimated GFR (eGFR) will be   calculated using the Chronic Kidney Disease Epidemiology Collaboration   (CKD-EPI) equation.         Today we talked about...     The key to effective blood pressure monitorin) Take your BP medication in the morning or as directed.     2) Sit in a chair with feet flat on the floor for 5-10 minutes before checking your blood pressure.     3) Use the same arm every time.       4) Use the same machine and appropriate cuff size.       5) Check your blood pressure at the same time of day.    High Blood Pressure      Over time, high blood pressure can damage the blood vessels and vital organs. This can lead to strokes, heart disease, and kidney disease. Treating high blood pressure decreases your chances of having heart and kidney problems.  A healthy diet low  in sodium and high in grains along with regular exercise have a direct effect on your blood pressure.      Several kinds of medicines are used to treat high blood pressure. The medicines may be effective alone, or they may be used with other drugs.     How do high blood pressure medicines work?   Each type of medicine lowers blood pressure in a different way.     Diuretics: also called water pills, rid the body of excess sodium (salt) and water. This means there is less blood volume putting pressure on the vessel walls.   Examples include: hydrochlorothiazide or furosemide (Lasix).     Beta blockers: reduce the heart rate and the heart's output of blood.  Slowing the heart down a bit allows it to fill more completely in between beats.  Examples include: metoprolol, atenolol, and propranolol.     Vasodilators, ACE inhibitors, ARBs, and calcium channel blockers: lower blood pressure by relaxing and opening up narrowed blood vessels allowing more room for blood to flow.   Examples:  -Calcium Channel Blockers: diltiazem, verapamil, nifedipine, and amlodipine .     -ACE inhibitors (angiotensin-converting enzyme inhibitor): enalapril, captopril and lisinopril.     -ARBs (angiotensin receptor blockers)- irbesartan, valsartan and losartan.      -Vasodilators- nitroglycerin, isosorbide mononitrate, and hydralazine.     What should I watch out for while taking high blood pressure medicines?   When you take high blood pressure medicine, it is important to:     Take the medicine regularly, exactly as prescribed. Do not change your dosage or stop taking the medicine without talking to your provider first. It can be dangerous to suddenly stop taking blood pressure medicine.     Tell your provider about any side effects right away. You may feel dizzy or have headaches while taking these medicines. Older people may be more sensitive than younger people to the medicine's effects. It may take several weeks or months to find the best  treatment for you. Make sure that you keep your follow-up appointments with your healthcare provider and let your provider know how you are tolerating your medicine.     Check your blood pressure (or have it checked) as often as your healthcare provider advises. Ask your provider if you should have a home blood pressure monitor to check your blood pressure between visits.    Does everyone need to take medication to control high blood pressure?  No.  Some patients with elevated BP readings are able to lower their blood pressure by modifying diet and increasing activity level.    Your pharmacist is a great resource for questions regarding your medication's side effects and potential interactions.     If you are having a side effect from your medication, please contact your primary provider.     If you believe you are experiencing a life threatening reaction to your medication call 911 immediately.       Bita Page, RN, RN

## 2020-03-17 DIAGNOSIS — I10 HYPERTENSION GOAL BP (BLOOD PRESSURE) < 150/90: ICD-10-CM

## 2020-03-17 DIAGNOSIS — N52.8 OTHER MALE ERECTILE DYSFUNCTION: ICD-10-CM

## 2020-03-17 RX ORDER — SILDENAFIL CITRATE 20 MG/1
TABLET ORAL
Qty: 30 TABLET | Refills: 1 | Status: SHIPPED | OUTPATIENT
Start: 2020-03-17

## 2020-03-17 RX ORDER — LISINOPRIL AND HYDROCHLOROTHIAZIDE 20; 25 MG/1; MG/1
TABLET ORAL
Qty: 180 TABLET | Refills: 0 | Status: SHIPPED | OUTPATIENT
Start: 2020-03-17 | End: 2020-06-09

## 2020-06-09 DIAGNOSIS — I10 HYPERTENSION GOAL BP (BLOOD PRESSURE) < 150/90: ICD-10-CM

## 2020-06-12 RX ORDER — LISINOPRIL AND HYDROCHLOROTHIAZIDE 20; 25 MG/1; MG/1
TABLET ORAL
Qty: 180 TABLET | Refills: 1 | Status: SHIPPED | OUTPATIENT
Start: 2020-06-12 | End: 2021-03-03

## 2020-09-11 ENCOUNTER — TELEPHONE (OUTPATIENT)
Dept: FAMILY MEDICINE | Facility: CLINIC | Age: 70
End: 2020-09-11

## 2020-09-11 NOTE — TELEPHONE ENCOUNTER
Patient is scheduled on 12/10/20 with José Lee MD.  This appointment needs to be rescheduled as the provider is going to be unavailable.  Patient was contacted by: Left message for patient to call me back.       Need to see if patient can arrive at 1:30, instead of 12:45

## 2020-11-09 DIAGNOSIS — B00.1 RECURRENT COLD SORES: ICD-10-CM

## 2020-11-10 RX ORDER — VALACYCLOVIR HYDROCHLORIDE 1 G/1
TABLET, FILM COATED ORAL
Qty: 24 TABLET | Refills: 0 | Status: SHIPPED | OUTPATIENT
Start: 2020-11-10 | End: 2021-04-13

## 2020-11-10 NOTE — TELEPHONE ENCOUNTER
Next 5 appointments (look out 90 days)    Dec 10, 2020  1:30 PM  PHYSICAL with José Lee MD  [facility] [address] [facility] [place] [address] [phone]        Rx refilled.    Kandy Gruber BSN, RN

## 2020-11-27 ENCOUNTER — DOCUMENTATION ONLY (OUTPATIENT)
Dept: LAB | Facility: CLINIC | Age: 70
End: 2020-11-27

## 2020-11-27 DIAGNOSIS — Z12.5 SCREENING PSA (PROSTATE SPECIFIC ANTIGEN): ICD-10-CM

## 2020-11-27 DIAGNOSIS — E78.5 HYPERLIPIDEMIA WITH TARGET LDL LESS THAN 130: ICD-10-CM

## 2020-11-27 DIAGNOSIS — Z13.6 CARDIOVASCULAR SCREENING; LDL GOAL LESS THAN 160: ICD-10-CM

## 2020-11-27 DIAGNOSIS — I10 ESSENTIAL HYPERTENSION WITH GOAL BLOOD PRESSURE LESS THAN 140/90: Primary | ICD-10-CM

## 2020-11-27 NOTE — PROGRESS NOTES
PLEASE REVIEW, PLACE FUTURE ORDERS OR SIGN PENDED ORDERS FOR PATIENT'S UPCOMING LAB ONLY APPOINTMENT ON 12.03.20.    THANK YOU.   [facility] KAMALA

## 2020-12-03 DIAGNOSIS — I10 ESSENTIAL HYPERTENSION WITH GOAL BLOOD PRESSURE LESS THAN 140/90: ICD-10-CM

## 2020-12-03 DIAGNOSIS — Z12.5 SCREENING PSA (PROSTATE SPECIFIC ANTIGEN): ICD-10-CM

## 2020-12-03 DIAGNOSIS — E78.5 HYPERLIPIDEMIA WITH TARGET LDL LESS THAN 130: ICD-10-CM

## 2020-12-03 LAB
ALBUMIN SERPL-MCNC: 4 G/DL (ref 3.4–5)
ANION GAP SERPL CALCULATED.3IONS-SCNC: 7 MMOL/L (ref 3–14)
BUN SERPL-MCNC: 12 MG/DL (ref 7–30)
CALCIUM SERPL-MCNC: 9.2 MG/DL (ref 8.5–10.1)
CHLORIDE SERPL-SCNC: 97 MMOL/L (ref 94–109)
CHOLEST SERPL-MCNC: 222 MG/DL
CO2 SERPL-SCNC: 30 MMOL/L (ref 20–32)
CREAT SERPL-MCNC: 0.89 MG/DL (ref 0.66–1.25)
GFR SERPL CREATININE-BSD FRML MDRD: 86 ML/MIN/{1.73_M2}
GLUCOSE SERPL-MCNC: 95 MG/DL (ref 70–99)
HDLC SERPL-MCNC: 59 MG/DL
LDLC SERPL CALC-MCNC: 109 MG/DL
NONHDLC SERPL-MCNC: 163 MG/DL
PHOSPHATE SERPL-MCNC: 3.2 MG/DL (ref 2.5–4.5)
POTASSIUM SERPL-SCNC: 3.9 MMOL/L (ref 3.4–5.3)
PSA SERPL-ACNC: 1 UG/L (ref 0–4)
SODIUM SERPL-SCNC: 134 MMOL/L (ref 133–144)
TRIGL SERPL-MCNC: 271 MG/DL

## 2020-12-03 PROCEDURE — 80069 RENAL FUNCTION PANEL: CPT | Performed by: FAMILY MEDICINE

## 2020-12-03 PROCEDURE — 36415 COLL VENOUS BLD VENIPUNCTURE: CPT | Performed by: FAMILY MEDICINE

## 2020-12-03 PROCEDURE — G0103 PSA SCREENING: HCPCS | Performed by: FAMILY MEDICINE

## 2020-12-03 PROCEDURE — 80061 LIPID PANEL: CPT | Performed by: FAMILY MEDICINE

## 2020-12-10 ENCOUNTER — OFFICE VISIT (OUTPATIENT)
Dept: FAMILY MEDICINE | Facility: CLINIC | Age: 70
End: 2020-12-10
Payer: COMMERCIAL

## 2020-12-10 ENCOUNTER — OFFICE VISIT (OUTPATIENT)
Dept: OPTOMETRY | Facility: CLINIC | Age: 70
End: 2020-12-10
Payer: COMMERCIAL

## 2020-12-10 VITALS
SYSTOLIC BLOOD PRESSURE: 137 MMHG | TEMPERATURE: 97.5 F | OXYGEN SATURATION: 96 % | HEART RATE: 62 BPM | HEIGHT: 72 IN | WEIGHT: 205 LBS | BODY MASS INDEX: 27.77 KG/M2 | DIASTOLIC BLOOD PRESSURE: 77 MMHG

## 2020-12-10 DIAGNOSIS — H52.4 PRESBYOPIA: ICD-10-CM

## 2020-12-10 DIAGNOSIS — H25.13 NUCLEAR SCLEROTIC CATARACT OF BOTH EYES: ICD-10-CM

## 2020-12-10 DIAGNOSIS — E78.5 HYPERLIPIDEMIA WITH TARGET LDL LESS THAN 130: ICD-10-CM

## 2020-12-10 DIAGNOSIS — H52.223 REGULAR ASTIGMATISM OF BOTH EYES: Primary | ICD-10-CM

## 2020-12-10 DIAGNOSIS — Z00.00 ENCOUNTER FOR MEDICARE ANNUAL WELLNESS EXAM: Primary | ICD-10-CM

## 2020-12-10 DIAGNOSIS — I10 HYPERTENSION GOAL BP (BLOOD PRESSURE) < 150/90: ICD-10-CM

## 2020-12-10 DIAGNOSIS — L40.9 PSORIASIS: ICD-10-CM

## 2020-12-10 DIAGNOSIS — N52.8 OTHER MALE ERECTILE DYSFUNCTION: ICD-10-CM

## 2020-12-10 PROCEDURE — 92015 DETERMINE REFRACTIVE STATE: CPT | Performed by: OPTOMETRIST

## 2020-12-10 PROCEDURE — 99397 PER PM REEVAL EST PAT 65+ YR: CPT | Performed by: FAMILY MEDICINE

## 2020-12-10 PROCEDURE — 99213 OFFICE O/P EST LOW 20 MIN: CPT | Mod: 25 | Performed by: FAMILY MEDICINE

## 2020-12-10 PROCEDURE — 92014 COMPRE OPH EXAM EST PT 1/>: CPT | Performed by: OPTOMETRIST

## 2020-12-10 RX ORDER — TRIAMCINOLONE ACETONIDE 5 MG/G
CREAM TOPICAL
Qty: 90 G | Refills: 1 | Status: SHIPPED | OUTPATIENT
Start: 2020-12-10 | End: 2021-12-14

## 2020-12-10 RX ORDER — SILDENAFIL 100 MG/1
100 TABLET, FILM COATED ORAL DAILY PRN
Qty: 20 TABLET | Refills: 1 | Status: SHIPPED | OUTPATIENT
Start: 2020-12-10

## 2020-12-10 ASSESSMENT — REFRACTION_WEARINGRX
OS_SPHERE: +2.50
OD_AXIS: 180
OD_SPHERE: PLANO
OS_CYLINDER: +1.00
OS_ADD: +2.50
OS_AXIS: 175
OS_SPHERE: PLANO
OD_ADD: +2.50
SPECS_TYPE: OTC'S
OD_SPHERE: +2.50
OD_CYLINDER: +1.00

## 2020-12-10 ASSESSMENT — VISUAL ACUITY
OS_CC: 20/25
OD_SC+: -1
OD_CC: 20/20
OS_SC: 20/25
OD_SC: 20/30
OS_SC+: -3
METHOD: SNELLEN - LINEAR

## 2020-12-10 ASSESSMENT — ACTIVITIES OF DAILY LIVING (ADL): CURRENT_FUNCTION: NO ASSISTANCE NEEDED

## 2020-12-10 ASSESSMENT — ENCOUNTER SYMPTOMS
NAUSEA: 0
SORE THROAT: 0
HEMATOCHEZIA: 0
PARESTHESIAS: 0
DIARRHEA: 0
WEAKNESS: 0
FEVER: 0
DIZZINESS: 0
HEARTBURN: 1
CONSTIPATION: 0
ARTHRALGIAS: 1
JOINT SWELLING: 0
HEADACHES: 0
DYSURIA: 0
ABDOMINAL PAIN: 0
FREQUENCY: 0
EYE PAIN: 0
CHILLS: 0
MYALGIAS: 0
PALPITATIONS: 0
HEMATURIA: 0
COUGH: 0
SHORTNESS OF BREATH: 0
NERVOUS/ANXIOUS: 0

## 2020-12-10 ASSESSMENT — REFRACTION_MANIFEST
OS_CYLINDER: +1.00
OD_AXIS: 180
OD_SPHERE: -0.50
OS_ADD: +2.50
OS_AXIS: 172
OS_CYLINDER: +1.00
OD_CYLINDER: +1.00
OD_CYLINDER: +1.00
OD_ADD: +2.50
OS_SPHERE: -0.25
OD_AXIS: 179
OS_SPHERE: -0.25
OS_AXIS: 180
OD_SPHERE: -0.50

## 2020-12-10 ASSESSMENT — KERATOMETRY
OS_AXISANGLE2_DEGREES: 161
OS_K2POWER_DIOPTERS: 42.75
OD_AXISANGLE2_DEGREES: 43
OD_K2POWER_DIOPTERS: 42.50
OD_K1POWER_DIOPTERS: 42.75
OS_K1POWER_DIOPTERS: 42.25

## 2020-12-10 ASSESSMENT — SLIT LAMP EXAM - LIDS: COMMENTS: 1+ LID THICKENING

## 2020-12-10 ASSESSMENT — CUP TO DISC RATIO
OD_RATIO: 0.3
OS_RATIO: 0.3

## 2020-12-10 ASSESSMENT — TONOMETRY
OS_IOP_MMHG: 14
IOP_METHOD: APPLANATION
OD_IOP_MMHG: 14

## 2020-12-10 ASSESSMENT — MIFFLIN-ST. JEOR: SCORE: 1726.84

## 2020-12-10 ASSESSMENT — CONF VISUAL FIELD
METHOD: COUNTING FINGERS
OD_NORMAL: 1
OS_NORMAL: 1

## 2020-12-10 ASSESSMENT — EXTERNAL EXAM - RIGHT EYE: OD_EXAM: NORMAL

## 2020-12-10 ASSESSMENT — EXTERNAL EXAM - LEFT EYE: OS_EXAM: NORMAL

## 2020-12-10 NOTE — PATIENT INSTRUCTIONS
Patient Education   Personalized Prevention Plan  You are due for the preventive services outlined below.  Your care team is available to assist you in scheduling these services.  If you have already completed any of these items, please share that information with your care team to update in your medical record.  Health Maintenance Due   Topic Date Due     Zoster (Shingles) Vaccine (2 of 3) 12/02/2014     PHQ-2  01/01/2020     Eye Exam  03/07/2020     FALL RISK ASSESSMENT  11/26/2020

## 2020-12-10 NOTE — PROGRESS NOTES
"  SUBJECTIVE:   Harvey Guevara is a 70 year old male who presents for Preventive Visit.    {Split Bill scripting  The purpose of this visit is to discuss your medical history and prevent health problems before you are sick. You may be responsible for a co-pay, coinsurance, or deductible if your visit today includes services such as checking on a sore throat, having an x-ray or lab test, or treating and evaluating a new or existing condition :607695}  Patient has been advised of split billing requirements and indicates understanding: {Yes and No:371445}  Are you in the first 12 months of your Medicare Part B coverage?  { :026416::\"No\"}    Physical Health:    In general, how would you rate your overall physical health? { :954089}    Outside of work, how many days during the week do you exercise? { :877105}    Outside of work, approximately how many minutes a day do you exercise?{ :692998}    If you drink alcohol do you typically have >3 drinks per day or >7 drinks per week? { :918895}    Do you usually eat at least 4 servings of fruit and vegetables a day, include whole grains & fiber and avoid regularly eating high fat or \"junk\" foods? { :404393::\"Yes\"}    Do you have any problems taking medications regularly?  { :083469::\"No\"}    Do you have any side effects from medications? { :868653}    Needs assistance for the following daily activities: { :384902}    Which of the following safety concerns are present in your home?  { :666532::\"none identified\"}     Hearing impairment: { :581805}    In the past 6 months, have you been bothered by leaking of urine? { :848702}    Mental Health:    In general, how would you rate your overall mental or emotional health? { :282736}  PHQ-2 Score:      Do you feel safe in your environment? { :202889}    Have you ever done Advance Care Planning? (For example, a Health Directive, POLST, or a discussion with a medical provider or your loved ones about your wishes): { " ":482677}    Additional concerns to address?  {If YES, notify patient they may be responsible for a copay, coinsurance or deductible if the visit today includes services such as checking on a sore throat, having an x-ray or lab test, or treating and evaluating a new or existing condition :428812::\"No\"}    Fall risk:  { :914502}  {If any of the above assessments are answered yes, consider ordering appropriate referrals (Optional):383554::\"click delete button to remove this line now\"}  Cognitive Screening: { :653773}    {Do you have sleep apnea, excessive snoring or daytime drowsiness? (Optional):410385}    {Outside tests to abstract? :959330}    {additional problems to add (Optional):912366}    Reviewed and updated as needed this visit by clinical staff                 Reviewed and updated as needed this visit by Provider                Social History     Tobacco Use     Smoking status: Former Smoker     Smokeless tobacco: Never Used     Tobacco comment: Lives in smoke free household   Substance Use Topics     Alcohol use: Yes     Alcohol/week: 0.0 standard drinks     Comment: 1-2 beers at night                            Current providers sharing in care for this patient include: {Rooming staff:  Please update Care Team in Rooming Activity, refresh this note and then delete this statement}  Patient Care Team:  José Lee MD as PCP - General (Family Practice)  José Lee MD as Assigned PCP  Bo De Dios MD as Assigned Musculoskeletal Provider    The following health maintenance items are reviewed in Epic and correct as of today:  Health Maintenance   Topic Date Due     ZOSTER IMMUNIZATION (2 of 3) 12/02/2014     PHQ-2  01/01/2020     EYE EXAM  03/07/2020     FALL RISK ASSESSMENT  11/26/2020     MEDICARE ANNUAL WELLNESS VISIT  11/26/2020     ADVANCE CARE PLANNING  11/26/2024     LIPID  12/03/2025     COLORECTAL CANCER SCREENING  03/14/2028     DTAP/TDAP/TD IMMUNIZATION (3 - Td) 07/16/2028     " "HEPATITIS C SCREENING  Completed     INFLUENZA VACCINE  Completed     Pneumococcal Vaccine: 65+ Years  Completed     AORTIC ANEURYSM SCREENING (SYSTEM ASSIGNED)  Completed     Pneumococcal Vaccine: Pediatrics (0 to 5 Years) and At-Risk Patients (6 to 64 Years)  Aged Out     IPV IMMUNIZATION  Aged Out     MENINGITIS IMMUNIZATION  Aged Out     HEPATITIS B IMMUNIZATION  Aged Out     {Chronicprobdata (Optional):726403}  {Decision Support (Optional):231259}    ROS:  {ROS COMP:510776}    OBJECTIVE:   There were no vitals taken for this visit. Estimated body mass index is 28.43 kg/m  as calculated from the following:    Height as of 19: 1.791 m (5' 10.5\").    Weight as of 19: 91.2 kg (201 lb).  EXAM:   {Exam :093667}    {Diagnostic Test Results (Optional):346224::\"Diagnostic Test Results:\",\"Labs reviewed in Epic\"}    ASSESSMENT / PLAN:   {Diag Picklist:680119}    Patient has been advised of split billing requirements and indicates understanding: {YES / NO:735507::\"Yes\"}    COUNSELING:  {Medicare Counselin}    Estimated body mass index is 28.43 kg/m  as calculated from the following:    Height as of 19: 1.791 m (5' 10.5\").    Weight as of 19: 91.2 kg (201 lb).    {Weight Management Plan (ACO) Complete if BMI is abnormal-  Ages 18-64  BMI >24.9.  Age 65+ with BMI <23 or >30 (Optional):056001}    He reports that he has quit smoking. He has never used smokeless tobacco.    Appropriate preventive services were discussed with this patient, including applicable screening as appropriate for cardiovascular disease, diabetes, osteopenia/osteoporosis, and glaucoma.  As appropriate for age/gender, discussed screening for colorectal cancer, prostate cancer, breast cancer, and cervical cancer. Checklist reviewing preventive services available has been given to the patient.    Reviewed patients plan of care and provided an AVS. The {CarePlan:130469} for Harvey meets the Care Plan requirement. This Care " Plan has been established and reviewed with the {PATIENT, FAMILY MEMBER, CAREGIVER:853885}.    Counseling Resources:  ATP IV Guidelines  Pooled Cohorts Equation Calculator  Breast Cancer Risk Calculator  BRCA-Related Cancer Risk Assessment: FHS-7 Tool  FRAX Risk Assessment  ICSI Preventive Guidelines  Dietary Guidelines for Americans, 2010  USDA's MyPlate  ASA Prophylaxis  Lung CA Screening    José Lee MD  [facility]

## 2020-12-10 NOTE — PROGRESS NOTES
Chief Complaint   Patient presents with     Annual Eye Exam     Annual Eye Exam       Has an appointment and will come back for to have his dilation checked afterwards     Last Eye Exam: 3/2019  Dilated Previously: Yes    What are you currently using to see?  readers       Distance Vision Acuity: Satisfied with vision, no changes or issues    Near Vision Acuity: Satisfied with vision while reading and using computer with readers, uses the same +2.50's    Eye Comfort: good  Do you use eye drops? : Not often, might use Visine if he gets an itchy eye   Occupation or Hobbies: Retired     [employer] Assistant           Medical, surgical and family histories reviewed and updated 12/10/2020.       OBJECTIVE: See Ophthalmology exam    ASSESSMENT:    ICD-10-CM    1. Regular astigmatism of both eyes  H52.223    2. Presbyopia  H52.4    3. Nuclear sclerotic cataract of both eyes  H25.13       PLAN:     Patient Instructions   Patient was advised of today's exam findings.  Mild cataracts   Continue use of +2.50 readers     Return in 1 year for eye exam    Chery Olson O.D.  [facility]  [address] [place], [place] [id number] [phone]

## 2020-12-10 NOTE — PATIENT INSTRUCTIONS
Patient was advised of today's exam findings.  Mild cataracts   Continue use of +2.50 readers     Return in 1 year for eye exam    Chery Olson O.D.  [facility]  [address] [place] [address] [phone]

## 2020-12-10 NOTE — PROGRESS NOTES
"SUBJECTIVE:   Harvey Guevara is a 70 year old male who presents for Preventive Visit.  History htn, high cholesterol and ed.  Some weight gain and a little more inactive.  Outside blood pressure readings ok. 130/80.   No chest pain or shortness of breath.  No nausea, vomiting or diarrhea or black/bloody stools. No urine changes or hematuria. [id number]   Emotionally doing ok.  No rash/mole changes.    Patient has been advised of split billing requirements and indicates understanding: Yes   Are you in the first 12 months of your Medicare coverage?  No    Healthy Habits:     In general, how would you rate your overall health?  Good    Frequency of exercise:  4-5 days/week    Duration of exercise:  Greater than 60 minutes    Do you usually eat at least 4 servings of fruit and vegetables a day, include whole grains    & fiber and avoid regularly eating high fat or \"junk\" foods?  Yes    Taking medications regularly:  Yes    Medication side effects:  None    Ability to successfully perform activities of daily living:  No assistance needed    Home Safety:  No safety concerns identified    Hearing Impairment:  No hearing concerns    In the past 6 months, have you been bothered by leaking of urine?  No    In general, how would you rate your overall mental or emotional health?  Good      PHQ-2 Total Score: 0    Additional concerns today:  No    Do you feel safe in your environment? Yes    Have you ever done Advance Care Planning? (For example, a Health Directive, POLST, or a discussion with a medical provider or your loved ones about your wishes): Yes, advance care planning is on file.      Fall risk  Fallen 2 or more times in the past year?: No  Any fall with injury in the past year?: No    Cognitive Screening   1) Repeat 3 items (Leader, Season, Table)    2) Clock draw: NORMAL  3) 3 item recall: Recalls 2 objects   Results: NORMAL clock, 1-2 items recalled: COGNITIVE IMPAIRMENT LESS LIKELY    Mini-CogTM Copyright S " Tree. Licensed by the author for use in [facility]; reprinted with permission (sheree@[facility]). All rights reserved.      Do you have sleep apnea, excessive snoring or daytime drowsiness?: sometimes    Reviewed and updated as needed this visit by clinical staff  Tobacco  Allergies  Meds   Med Hx  Surg Hx  Fam Hx  Soc Hx        Reviewed and updated as needed this visit by Provider                Social History     Tobacco Use     Smoking status: Former Smoker     Smokeless tobacco: Never Used     Tobacco comment: Lives in smoke free household   Substance Use Topics     Alcohol use: Yes     Alcohol/week: 0.0 standard drinks     Comment: 1-2 beers at night      If you drink alcohol do you typically have >3 drinks per day or >7 drinks per week? No    Alcohol Use 11/26/2019   Prescreen: >3 drinks/day or >7 drinks/week? No   Prescreen: >3 drinks/day or >7 drinks/week? -     Current providers sharing in care for this patient include:   Patient Care Team:  José Lee MD as PCP - General (Family Practice)  José Lee MD as Assigned PCP  Bo De Dios MD as Assigned Musculoskeletal Provider    The following health maintenance items are reviewed in Epic and correct as of today:  Health Maintenance   Topic Date Due     ZOSTER IMMUNIZATION (2 of 3) 12/02/2014     PHQ-2  01/01/2020     FALL RISK ASSESSMENT  11/26/2020     MEDICARE ANNUAL WELLNESS VISIT  11/26/2020     EYE EXAM  12/10/2021     ADVANCE CARE PLANNING  11/26/2024     LIPID  12/03/2025     COLORECTAL CANCER SCREENING  03/14/2028     DTAP/TDAP/TD IMMUNIZATION (3 - Td) 07/16/2028     HEPATITIS C SCREENING  Completed     INFLUENZA VACCINE  Completed     Pneumococcal Vaccine: 65+ Years  Completed     AORTIC ANEURYSM SCREENING (SYSTEM ASSIGNED)  Completed     Pneumococcal Vaccine: Pediatrics (0 to 5 Years) and At-Risk Patients (6 to 64 Years)  Aged Out     IPV IMMUNIZATION  Aged Out     MENINGITIS IMMUNIZATION  Aged Out      "HEPATITIS B IMMUNIZATION  Aged Out     Labs reviewed in EPIC      Review of Systems   Constitutional: Negative for chills and fever.   HENT: Negative for congestion, ear pain, hearing loss and sore throat.    Eyes: Negative for pain and visual disturbance.   Respiratory: Negative for cough and shortness of breath.    Cardiovascular: Negative for chest pain, palpitations and peripheral edema.   Gastrointestinal: Positive for heartburn. Negative for abdominal pain, constipation, diarrhea, hematochezia and nausea.   Genitourinary: Positive for impotence. Negative for discharge, dysuria, frequency, genital sores, hematuria and urgency.   Musculoskeletal: Positive for arthralgias. Negative for joint swelling and myalgias.   Skin: Negative for rash.   Neurological: Negative for dizziness, weakness, headaches and paresthesias.   Psychiatric/Behavioral: Negative for mood changes. The patient is not nervous/anxious.      All other ROS negative. GERD stable.     OBJECTIVE:   Physical Exam   /77   Pulse 62   Temp 97.5  F (36.4  C) (Tympanic)   Ht 1.835 m (6' 0.25\")   Wt 93 kg (205 lb)   SpO2 96%   BMI 27.61 kg/m     GENERAL: healthy, alert and no distress  EYES: Eyes grossly normal to inspection, PERRL and conjunctivae and sclerae normal  HENT: ear canals and TM's normal, nose and mouth without ulcers or lesions  NECK: no adenopathy, no asymmetry, masses, or scars and thyroid normal to palpation  RESP: lungs clear to auscultation - no rales, rhonchi or wheezes  BREAST: normal without masses, tenderness or nipple discharge and no palpable axillary masses or adenopathy  CV: regular rate and rhythm, normal S1 S2, no S3 or S4, no murmur, click or rub, no peripheral edema and peripheral pulses strong  ABDOMEN: soft, nontender, no hepatosplenomegaly, no masses and bowel sounds normal   (male): patient deferred /rectal exams. Denies pain/masses/hernia  MS: no gross musculoskeletal defects noted, no edema  SKIN: no " "suspicious lesions or rashes  NEURO: Normal strength and tone, mentation intact and speech normal  PSYCH: mentation appears normal, affect normal/bright  LYMPH: no cervical, supraclavicular, axillaryadenopathy      ASSESSMENT / PLAN:   ASSESSMENT / PLAN:  (Z00.00) Encounter for Medicare annual wellness exam  (primary encounter diagnosis)  Comment: generally healthy and normal exam/labs  Plan: Reviewed self mole/testicle check handout.  VitaminD    (L40.9) Psoriasis  Comment: stable  Plan: triamcinolone (ARISTOCORT HP) 0.5 % external         cream        Prn.     (N52.8) Other male erectile dysfunction  Comment: lower dosage not as helpful  Plan: sildenafil (VIAGRA) 100 MG tablet        Reveiwed risks and side effects of medication  To ER if chest pain, shortness of breath , prolonged erections. Patient ok with paying out of pocket.     (E78.5) Hyperlipidemia with target LDL less than 130  Comment: triglycerides worse  Plan: diet/exercise and 5 lbs weight loss. Lower carbs. Continue zocor. Chest pain or shortness of breath to er    (I10) Hypertension goal BP (blood pressure) < 150/90  Comment: stable  Plan: continue self-monitor. Continue med. Return to clinic if worse. Call/email with questions/concerns         Patient has been advised of split billing requirements and indicates understanding: Yes  COUNSELING:  Reviewed preventive health counseling, as reflected in patient instructions       Regular exercise       Healthy diet/nutrition       Vision screening       Hearing screening       Dental care       Bladder control       Fall risk prevention       Aspirin Prophylaxsis       Colon cancer screening       Prostate cancer screening       Osteoporosis Prevention/Bone Health    Estimated body mass index is 27.61 kg/m  as calculated from the following:    Height as of this encounter: 1.835 m (6' 0.25\").    Weight as of this encounter: 93 kg (205 lb).        He reports that he has quit smoking. He has never used " smokeless tobacco.      Appropriate preventive services were discussed with this patient, including applicable screening as appropriate for cardiovascular disease, diabetes, osteopenia/osteoporosis, and glaucoma.  As appropriate for age/gender, discussed screening for colorectal cancer, prostate cancer, breast cancer, and cervical cancer. Checklist reviewing preventive services available has been given to the patient.    Reviewed patients plan of care and provided an AVS. The Intermediate Care Plan ( asthma action plan, low back pain action plan, and migraine action plan) for Harvey meets the Care Plan requirement. This Care Plan has been established and reviewed with the Patient.    Counseling Resources:  ATP IV Guidelines  Pooled Cohorts Equation Calculator  Breast Cancer Risk Calculator  Breast Cancer: Medication to Reduce Risk  FRAX Risk Assessment  ICSI Preventive Guidelines  Dietary Guidelines for Americans, 2010  USDA's MyPlate  ASA Prophylaxis  Lung CA Screening    José Lee MD  [facility]    Identified Health Risks:

## 2020-12-10 NOTE — LETTER
12/10/2020         RE: Harvey Guevara  [address] [facility] [address]        Dear Colleague,    Thank you for referring your patient, Harvey Guevara, to the [facility]. Please see a copy of my visit note below.    Chief Complaint   Patient presents with     Annual Eye Exam     Annual Eye Exam       Has an appointment and will come back for to have his dilation checked afterwards     Last Eye Exam: 3/2019  Dilated Previously: Yes    What are you currently using to see?  readers       Distance Vision Acuity: Satisfied with vision, no changes or issues    Near Vision Acuity: Satisfied with vision while reading and using computer with readers, uses the same +2.50's    Eye Comfort: good  Do you use eye drops? : Not often, might use Visine if he gets an itchy eye   Occupation or Hobbies: Retired     [employer] Assistant           Medical, surgical and family histories reviewed and updated 12/10/2020.       OBJECTIVE: See Ophthalmology exam    ASSESSMENT:    ICD-10-CM    1. Regular astigmatism of both eyes  H52.223    2. Presbyopia  H52.4    3. Nuclear sclerotic cataract of both eyes  H25.13       PLAN:     Patient Instructions   Patient was advised of today's exam findings.  Mild cataracts   Continue use of +2.50 readers     Return in 1 year for eye exam    Chery Olson O.D.  [facility]  [address] [place], [place] [id number] [phone]           Again, thank you for allowing me to participate in the care of your patient.        Sincerely,        Chery Olson, OD

## 2021-03-09 ENCOUNTER — IMMUNIZATION (OUTPATIENT)
Dept: NURSING | Facility: CLINIC | Age: 71
End: 2021-03-09
Payer: COMMERCIAL

## 2021-03-09 PROCEDURE — 91300 PR COVID VAC PFIZER DIL RECON 30 MCG/0.3 ML IM: CPT

## 2021-03-09 PROCEDURE — 0001A PR COVID VAC PFIZER DIL RECON 30 MCG/0.3 ML IM: CPT

## 2021-03-30 ENCOUNTER — IMMUNIZATION (OUTPATIENT)
Dept: NURSING | Facility: CLINIC | Age: 71
End: 2021-03-30
Attending: FAMILY MEDICINE
Payer: COMMERCIAL

## 2021-03-30 PROCEDURE — 91300 PR COVID VAC PFIZER DIL RECON 30 MCG/0.3 ML IM: CPT

## 2021-03-30 PROCEDURE — 0002A PR COVID VAC PFIZER DIL RECON 30 MCG/0.3 ML IM: CPT

## 2021-04-12 DIAGNOSIS — B00.1 RECURRENT COLD SORES: ICD-10-CM

## 2021-04-12 NOTE — TELEPHONE ENCOUNTER
Routing refill request to provider for review/approval because:  Did you want to consider a preventative treatment plan.  He has had #24 since the send of November.  Thank you. Mickie Sifuentes R.N.

## 2021-04-13 RX ORDER — VALACYCLOVIR HYDROCHLORIDE 1 G/1
TABLET, FILM COATED ORAL
Qty: 24 TABLET | Refills: 0 | Status: SHIPPED | OUTPATIENT
Start: 2021-04-13 | End: 2022-01-31

## 2021-09-26 ENCOUNTER — HEALTH MAINTENANCE LETTER (OUTPATIENT)
Age: 71
End: 2021-09-26

## 2021-11-29 DIAGNOSIS — E78.5 HYPERLIPIDEMIA WITH TARGET LDL LESS THAN 130: ICD-10-CM

## 2021-11-29 DIAGNOSIS — I10 HYPERTENSION GOAL BP (BLOOD PRESSURE) < 150/90: ICD-10-CM

## 2021-12-01 RX ORDER — LISINOPRIL AND HYDROCHLOROTHIAZIDE 20; 25 MG/1; MG/1
TABLET ORAL
Qty: 180 TABLET | Refills: 0 | Status: SHIPPED | OUTPATIENT
Start: 2021-12-01 | End: 2022-04-25

## 2021-12-01 RX ORDER — SIMVASTATIN 40 MG
TABLET ORAL
Qty: 90 TABLET | Refills: 0 | Status: SHIPPED | OUTPATIENT
Start: 2021-12-01 | End: 2022-04-25

## 2021-12-01 NOTE — TELEPHONE ENCOUNTER
Prescription approved per [facility] Refill Protocol.  Need to keep upcoming appointment for further refills  Next 5 appointments (look out 90 days)    Dec 14, 2021  7:30 AM  PHYSICAL with José Lee MD  [facility] [address] [facility] [address]  [phone]        Huyen Owen RN

## 2021-12-06 ENCOUNTER — DOCUMENTATION ONLY (OUTPATIENT)
Dept: LAB | Facility: CLINIC | Age: 71
End: 2021-12-06
Payer: COMMERCIAL

## 2021-12-06 DIAGNOSIS — I10 ESSENTIAL HYPERTENSION WITH GOAL BLOOD PRESSURE LESS THAN 140/90: ICD-10-CM

## 2021-12-06 DIAGNOSIS — Z12.5 SCREENING PSA (PROSTATE SPECIFIC ANTIGEN): ICD-10-CM

## 2021-12-06 DIAGNOSIS — E78.5 HYPERLIPIDEMIA WITH TARGET LDL LESS THAN 130: Primary | ICD-10-CM

## 2021-12-06 NOTE — PROGRESS NOTES
Harvey Guevara has an upcoming lab appointment:    Future Appointments   Date Time Provider Department Center   12/7/2021  7:45 AM AN LAB [facility]   12/14/2021  7:30 AM José Lee MD [facility]   12/14/2021  9:00 AM [facility]     Patient is scheduled for the following lab(s): PV labs     There is no order available. Please review and place either future orders or HMPO (Review of Health Maintenance Protocol Orders), as appropriate.    Health Maintenance Due   Topic     ANNUAL REVIEW OF HM ORDERS      Zara Zaragoza

## 2021-12-07 ENCOUNTER — LAB (OUTPATIENT)
Dept: LAB | Facility: CLINIC | Age: 71
End: 2021-12-07
Payer: COMMERCIAL

## 2021-12-07 DIAGNOSIS — Z12.5 SCREENING PSA (PROSTATE SPECIFIC ANTIGEN): ICD-10-CM

## 2021-12-07 DIAGNOSIS — E78.5 HYPERLIPIDEMIA WITH TARGET LDL LESS THAN 130: ICD-10-CM

## 2021-12-07 DIAGNOSIS — I10 ESSENTIAL HYPERTENSION WITH GOAL BLOOD PRESSURE LESS THAN 140/90: ICD-10-CM

## 2021-12-07 LAB
ALBUMIN SERPL-MCNC: 4 G/DL (ref 3.4–5)
ANION GAP SERPL CALCULATED.3IONS-SCNC: 6 MMOL/L (ref 3–14)
BUN SERPL-MCNC: 11 MG/DL (ref 7–30)
CALCIUM SERPL-MCNC: 9.1 MG/DL (ref 8.5–10.1)
CHLORIDE BLD-SCNC: 94 MMOL/L (ref 94–109)
CHOLEST SERPL-MCNC: 202 MG/DL
CO2 SERPL-SCNC: 30 MMOL/L (ref 20–32)
CREAT SERPL-MCNC: 0.9 MG/DL (ref 0.66–1.25)
FASTING STATUS PATIENT QL REPORTED: YES
GFR SERPL CREATININE-BSD FRML MDRD: 85 ML/MIN/1.73M2
GLUCOSE BLD-MCNC: 103 MG/DL (ref 70–99)
HDLC SERPL-MCNC: 77 MG/DL
LDLC SERPL CALC-MCNC: 91 MG/DL
NONHDLC SERPL-MCNC: 125 MG/DL
PHOSPHATE SERPL-MCNC: 3.1 MG/DL (ref 2.5–4.5)
POTASSIUM BLD-SCNC: 4.1 MMOL/L (ref 3.4–5.3)
PSA SERPL-MCNC: 1 UG/L (ref 0–4)
SODIUM SERPL-SCNC: 130 MMOL/L (ref 133–144)
TRIGL SERPL-MCNC: 169 MG/DL

## 2021-12-07 PROCEDURE — 80061 LIPID PANEL: CPT

## 2021-12-07 PROCEDURE — G0103 PSA SCREENING: HCPCS

## 2021-12-07 PROCEDURE — 80069 RENAL FUNCTION PANEL: CPT

## 2021-12-07 PROCEDURE — 36415 COLL VENOUS BLD VENIPUNCTURE: CPT

## 2021-12-14 ENCOUNTER — OFFICE VISIT (OUTPATIENT)
Dept: OPTOMETRY | Facility: CLINIC | Age: 71
End: 2021-12-14
Payer: COMMERCIAL

## 2021-12-14 ENCOUNTER — OFFICE VISIT (OUTPATIENT)
Dept: FAMILY MEDICINE | Facility: CLINIC | Age: 71
End: 2021-12-14
Payer: COMMERCIAL

## 2021-12-14 VITALS
HEIGHT: 72 IN | WEIGHT: 205 LBS | TEMPERATURE: 96.1 F | DIASTOLIC BLOOD PRESSURE: 88 MMHG | SYSTOLIC BLOOD PRESSURE: 138 MMHG | OXYGEN SATURATION: 99 % | RESPIRATION RATE: 16 BRPM | HEART RATE: 65 BPM | BODY MASS INDEX: 27.77 KG/M2

## 2021-12-14 DIAGNOSIS — Z00.00 ENCOUNTER FOR MEDICARE ANNUAL WELLNESS EXAM: Primary | ICD-10-CM

## 2021-12-14 DIAGNOSIS — E78.5 HYPERLIPIDEMIA WITH TARGET LDL LESS THAN 130: ICD-10-CM

## 2021-12-14 DIAGNOSIS — M25.552 HIP PAIN, LEFT: ICD-10-CM

## 2021-12-14 DIAGNOSIS — H52.4 PRESBYOPIA: ICD-10-CM

## 2021-12-14 DIAGNOSIS — I10 HYPERTENSION GOAL BP (BLOOD PRESSURE) < 150/90: ICD-10-CM

## 2021-12-14 DIAGNOSIS — H52.223 REGULAR ASTIGMATISM OF BOTH EYES: ICD-10-CM

## 2021-12-14 DIAGNOSIS — Z01.01 VISION EXAM WITH ABNORMAL FINDINGS: Primary | ICD-10-CM

## 2021-12-14 DIAGNOSIS — L40.9 PSORIASIS: ICD-10-CM

## 2021-12-14 DIAGNOSIS — H25.13 NUCLEAR SCLEROTIC CATARACT OF BOTH EYES: ICD-10-CM

## 2021-12-14 PROCEDURE — 99397 PER PM REEVAL EST PAT 65+ YR: CPT | Performed by: FAMILY MEDICINE

## 2021-12-14 PROCEDURE — 92015 DETERMINE REFRACTIVE STATE: CPT | Performed by: OPTOMETRIST

## 2021-12-14 PROCEDURE — 92014 COMPRE OPH EXAM EST PT 1/>: CPT | Performed by: OPTOMETRIST

## 2021-12-14 PROCEDURE — 99214 OFFICE O/P EST MOD 30 MIN: CPT | Mod: 25 | Performed by: FAMILY MEDICINE

## 2021-12-14 RX ORDER — TRIAMCINOLONE ACETONIDE 5 MG/G
CREAM TOPICAL
Qty: 90 G | Refills: 1 | Status: SHIPPED | OUTPATIENT
Start: 2021-12-14

## 2021-12-14 ASSESSMENT — REFRACTION_WEARINGRX
OS_SPHERE: -0.25
OD_SPHERE: -0.50
OD_CYLINDER: +1.00
OS_ADD: +2.50
OD_ADD: +2.50
OS_AXIS: 180
OS_SPHERE: +2.50
OD_AXIS: 180
SPECS_TYPE: OTC
OD_SPHERE: +2.50
OS_CYLINDER: +1.00

## 2021-12-14 ASSESSMENT — TONOMETRY
IOP_METHOD: APPLANATION
OS_IOP_MMHG: 15
OD_IOP_MMHG: 15

## 2021-12-14 ASSESSMENT — ENCOUNTER SYMPTOMS
NAUSEA: 0
ABDOMINAL PAIN: 0
DIARRHEA: 0
ARTHRALGIAS: 0
NERVOUS/ANXIOUS: 0
HEMATURIA: 0
JOINT SWELLING: 0
MYALGIAS: 0
CHILLS: 0
PARESTHESIAS: 0
HEMATOCHEZIA: 0
PALPITATIONS: 0
FEVER: 0
CONSTIPATION: 0
EYE PAIN: 0
SHORTNESS OF BREATH: 0
DYSURIA: 0
FREQUENCY: 1
WEAKNESS: 0
HEADACHES: 0
HEARTBURN: 1
SORE THROAT: 0
DIZZINESS: 0
COUGH: 0

## 2021-12-14 ASSESSMENT — CONF VISUAL FIELD
OS_NORMAL: 1
METHOD: COUNTING FINGERS
OD_NORMAL: 1

## 2021-12-14 ASSESSMENT — VISUAL ACUITY
OS_SC: 20/30
OS_CC: 20/25
OD_CC: 20/25
OD_SC+: +1
METHOD: SNELLEN - LINEAR
CORRECTION_TYPE: GLASSES
OD_SC: 20/30

## 2021-12-14 ASSESSMENT — REFRACTION_MANIFEST
OD_SPHERE: -0.25
OD_CYLINDER: +1.00
OD_SPHERE: -0.25
OS_AXIS: 165
OS_CYLINDER: +1.00
OD_CYLINDER: +0.75
OS_CYLINDER: +1.00
OS_SPHERE: +0.00
OD_AXIS: 177
OD_AXIS: 175
OS_SPHERE: PLANO
OS_ADD: +2.50
OD_ADD: +2.50
OS_AXIS: 166
METHOD_AUTOREFRACTION: 1

## 2021-12-14 ASSESSMENT — EXTERNAL EXAM - RIGHT EYE: OD_EXAM: NORMAL

## 2021-12-14 ASSESSMENT — KERATOMETRY
OD_AXISANGLE2_DEGREES: 34
OS_K1POWER_DIOPTERS: 42.25
OS_K2POWER_DIOPTERS: 42.25
OD_K1POWER_DIOPTERS: 42.25
OS_AXISANGLE2_DEGREES: 180
OD_K2POWER_DIOPTERS: 42.50

## 2021-12-14 ASSESSMENT — EXTERNAL EXAM - LEFT EYE: OS_EXAM: NORMAL

## 2021-12-14 ASSESSMENT — SLIT LAMP EXAM - LIDS: COMMENTS: 1+ LID THICKENING

## 2021-12-14 ASSESSMENT — CUP TO DISC RATIO
OS_RATIO: 0.3
OD_RATIO: 0.3

## 2021-12-14 ASSESSMENT — ACTIVITIES OF DAILY LIVING (ADL): CURRENT_FUNCTION: NO ASSISTANCE NEEDED

## 2021-12-14 ASSESSMENT — MIFFLIN-ST. JEOR: SCORE: 1717.87

## 2021-12-14 NOTE — LETTER
12/14/2021         RE: Harvey Guevara  [address]  [facility] [address]        Dear Colleague,    Thank you for referring your patient, Harvey Guevara, to the [facility]. Please see a copy of my visit note below.    Chief Complaint   Patient presents with     COMPREHENSIVE EYE EXAM         Last Eye Exam: 12/10/20  Dilated Previously: Yes. He doesn't want to get dilated today, too busy helping someone move    What are you currently using to see?  readers       Distance Vision Acuity: Satisfied with vision, seems fine     Near Vision Acuity: Satisfied with vision while reading and using computer with readers. Uses a different strength, thinks that they are +1.25's on the computer    Eye Comfort: good  Do you use eye drops? : Yes: As needed, not often   Occupation or Hobbies: Retired     [employer] Assistant           Medical, surgical and family histories reviewed and updated 12/14/2021.       OBJECTIVE: See Ophthalmology exam    ASSESSMENT:    ICD-10-CM    1. Vision exam with abnormal findings  Z01.01 EYE EXAM (SIMPLE-NONBILLABLE)     REFRACTION   2. Regular astigmatism of both eyes  H52.223 EYE EXAM (SIMPLE-NONBILLABLE)     REFRACTION   3. Presbyopia  H52.4 EYE EXAM (SIMPLE-NONBILLABLE)     REFRACTION   4. Nuclear sclerotic cataract of both eyes  H25.13 EYE EXAM (SIMPLE-NONBILLABLE)     REFRACTION      PLAN:     Patient Instructions   Reading glasses advised  Return in 1 year for eye exam    Chery Olson, OD  [phone]                       Again, thank you for allowing me to participate in the care of your patient.        Sincerely,        Chery Olson, OD

## 2021-12-14 NOTE — PROGRESS NOTES
Chief Complaint   Patient presents with     COMPREHENSIVE EYE EXAM         Last Eye Exam: 12/10/20  Dilated Previously: Yes. He doesn't want to get dilated today, too busy helping someone move    What are you currently using to see?  readers       Distance Vision Acuity: Satisfied with vision, seems fine     Near Vision Acuity: Satisfied with vision while reading and using computer with readers. Uses a different strength, thinks that they are +1.25's on the computer    Eye Comfort: good  Do you use eye drops? : Yes: As needed, not often   Occupation or Hobbies: Retired     [employer] Assistant           Medical, surgical and family histories reviewed and updated 12/14/2021.       OBJECTIVE: See Ophthalmology exam    ASSESSMENT:    ICD-10-CM    1. Vision exam with abnormal findings  Z01.01 EYE EXAM (SIMPLE-NONBILLABLE)     REFRACTION   2. Regular astigmatism of both eyes  H52.223 EYE EXAM (SIMPLE-NONBILLABLE)     REFRACTION   3. Presbyopia  H52.4 EYE EXAM (SIMPLE-NONBILLABLE)     REFRACTION   4. Nuclear sclerotic cataract of both eyes  H25.13 EYE EXAM (SIMPLE-NONBILLABLE)     REFRACTION      PLAN:     Patient Instructions   Reading glasses advised  Return in 1 year for eye exam    Chery Olson, OD  [phone]

## 2021-12-14 NOTE — PATIENT INSTRUCTIONS
Reading glasses advised  Return in 1 year for eye exam    Chery Olson, OD  [phone]

## 2021-12-14 NOTE — PATIENT INSTRUCTIONS
Patient Education   Personalized Prevention Plan  You are due for the preventive services outlined below.  Your care team is available to assist you in scheduling these services.  If you have already completed any of these items, please share that information with your care team to update in your medical record.  Health Maintenance Due   Topic Date Due     ANNUAL REVIEW OF HM ORDERS  Never done     LUNG CANCER SCREENING  Never done     PHQ-2  01/01/2021     Eye Exam  12/10/2021     FALL RISK ASSESSMENT  12/10/2021     Annual Wellness Visit  12/10/2021

## 2021-12-14 NOTE — PROGRESS NOTES
"SUBJECTIVE:   Harvey Guevara is a 71 year old male who presents for Preventive Visit.    History htn, high cholesterol, psoriasis, cold sores and ed.  Outside blood pressure readings normal 130/80.   No chest pain or shortness of breath. Exercise - active.  No nausea, vomiting or diarrhea or black/bloody stools. No urine changes or hematuria. No leg swelling.   Emotionally doing ok.   left hip on/off pain. Doing leg exercises can help.   Psoriasis stable.   Ice fishing. No testicle pain/masses/hernia. Sleep overall ok.   Memory ok. No falls.   Patient has been advised of split billing requirements and indicates understanding: Yes   Are you in the first 12 months of your Medicare coverage?  No    Healthy Habits:     In general, how would you rate your overall health?  Excellent    Frequency of exercise:  4-5 days/week    Duration of exercise:  30-45 minutes    Do you usually eat at least 4 servings of fruit and vegetables a day, include whole grains    & fiber and avoid regularly eating high fat or \"junk\" foods?  Yes    Taking medications regularly:  Yes    Medication side effects:  None    Ability to successfully perform activities of daily living:  No assistance needed    Home Safety:  No safety concerns identified    Hearing Impairment:  No hearing concerns    In the past 6 months, have you been bothered by leaking of urine?  No    In general, how would you rate your overall mental or emotional health?  Excellent      PHQ-2 Total Score: 0    Additional concerns today:  Yes    Do you feel safe in your environment? Yes      Fall risk  Fallen 2 or more times in the past year?: No  Any fall with injury in the past year?: No    Cognitive Screening   1) Repeat 3 items (Leader, Season, Table)    2) Clock draw: NORMAL  3) 3 item recall: Recalls 3 objects  Results: 3 items recalled: COGNITIVE IMPAIRMENT LESS LIKELY    Mini-CogTM Copyright [employer]. Licensed by the author for use in [facility]; reprinted " with permission ([email or web address]). All rights reserved.      Do you have sleep apnea, excessive snoring or daytime drowsiness?: yes    Reviewed and updated as needed this visit by clinical staff  Tobacco  Allergies  Meds   Med Hx  Surg Hx  Fam Hx  Soc Hx       Reviewed and updated as needed this visit by Provider               Social History     Tobacco Use     Smoking status: Former Smoker     Smokeless tobacco: Never Used     Tobacco comment: Lives in smoke free household   Substance Use Topics     Alcohol use: Yes     Alcohol/week: 0.0 standard drinks     Comment: 1-2 beers at night      If you drink alcohol do you typically have >3 drinks per day or >7 drinks per week? No    Alcohol Use 12/14/2021   Prescreen: >3 drinks/day or >7 drinks/week? No   Prescreen: >3 drinks/day or >7 drinks/week? -       Patient Care Team:  José Lee MD as PCP - General (Family Practice)  José Lee MD as Assigned PCP  Chery Olson OD as Assigned Surgical Provider    The following health maintenance items are reviewed in Epic and correct as of today:  Health Maintenance Due   Topic Date Due     ANNUAL REVIEW OF HM ORDERS  Never done     LUNG CANCER SCREENING  Never done     EYE EXAM  12/10/2021     FALL RISK ASSESSMENT  12/10/2021     Labs reviewed in EPIC    Review of Systems   Constitutional: Negative for chills and fever.   HENT: Negative for congestion, ear pain, hearing loss and sore throat.    Eyes: Negative for pain and visual disturbance.   Respiratory: Negative for cough and shortness of breath.    Cardiovascular: Negative for chest pain, palpitations and peripheral edema.   Gastrointestinal: Positive for heartburn. Negative for abdominal pain, constipation, diarrhea, hematochezia and nausea.   Genitourinary: Positive for frequency and impotence. Negative for dysuria, genital sores, hematuria and urgency.   Musculoskeletal: Negative for arthralgias, joint swelling and myalgias.   Skin: Negative for  rash.   Neurological: Negative for dizziness, weakness, headaches and paresthesias.   Psychiatric/Behavioral: Negative for mood changes. The patient is not nervous/anxious.      All other ROS negative    OBJECTIVE:   BP (!) 167/89   Pulse 65   Temp (!) 96.1  F (35.6  C) (Tympanic)   Resp 16   Ht 1.829 m (6')   Wt 93 kg (205 lb)   SpO2 99%   BMI 27.80 kg/m   Estimated body mass index is 27.8 kg/m  as calculated from the following:    Height as of this encounter: 1.829 m (6').    Weight as of this encounter: 93 kg (205 lb).  Physical Exam  GENERAL: healthy, alert and no distress  EYES: Eyes grossly normal to inspection, PERRL and conjunctivae and sclerae normal  HENT: ear canals and TM's normal, nose and mouth without ulcers or lesions  NECK: no adenopathy, no asymmetry, masses, or scars and thyroid normal to palpation  RESP: lungs clear to auscultation - no rales, rhonchi or wheezes  BREAST: normal without masses, tenderness or nipple discharge and no palpable axillary masses or adenopathy  CV: regular rate and rhythm, normal S1 S2, no S3 or S4, no murmur, click or rub, no peripheral edema and peripheral pulses strong  ABDOMEN: soft, nontender, no hepatosplenomegaly, no masses and bowel sounds normal   (male): patient deferred /rectal exams  MS: no gross musculoskeletal defects noted, no edema  SKIN: no suspicious lesions or rashes  NEURO: Normal strength and tone, mentation intact and speech normal  PSYCH: mentation appears normal, affect normal/bright  LYMPH: no cervical, supraclavicular, axillary,adenopathy    ASSESSMENT / PLAN:   ASSESSMENT / PLAN:  (Z00.00) Encounter for Medicare annual wellness exam  (primary encounter diagnosis)  Comment: generally healthy and normal exam/labs. Memory ok and no falls.   Plan: Reviewed self mole/testicle check handout. VitaminD.     (M25.552) Hip pain, left  Comment: bursitis vs OA vs strain. Intermittent.   Plan: Orthopedic  Referral        Continue  exercise. Heat and tylenol prn. Can see ortho for second opinion if worse.     (I10) Hypertension goal BP (blood pressure) < 150/90  Comment: stable  Plan:cont self-mointor. Add norvasc if worse. Sodium a little low so a little less water. Chest pain or shortness of breath to er. Recheck in 6 months  Sooner if worse    (E78.5) Hyperlipidemia with target LDL less than 130  Comment: stable  Plan: continue zocor. Lower carb diet    (L40.9) Psoriasis  Comment: stable  Plan: triamcinolone (ARISTOCORT HP) 0.5 % external         cream        Back to derm if worse.         Patient has been advised of split billing requirements and indicates understanding: Yes  COUNSELING:  Reviewed preventive health counseling, as reflected in patient instructions       Regular exercise       Healthy diet/nutrition       Vision screening       Hearing screening       Dental care       Bladder control       Fall risk prevention       Colon cancer screening       Prostate cancer screening    Estimated body mass index is 27.8 kg/m  as calculated from the following:    Height as of this encounter: 1.829 m (6').    Weight as of this encounter: 93 kg (205 lb).    Weight management plan: Discussed healthy diet and exercise guidelines    He reports that he has quit smoking. He has never used smokeless tobacco.      Appropriate preventive services were discussed with this patient, including applicable screening as appropriate for cardiovascular disease, diabetes, osteopenia/osteoporosis, and glaucoma.  As appropriate for age/gender, discussed screening for colorectal cancer, prostate cancer, breast cancer, and cervical cancer. Checklist reviewing preventive services available has been given to the patient.    Reviewed patients plan of care and provided an AVS. The Intermediate Care Plan ( asthma action plan, low back pain action plan, and migraine action plan) for Harvey meets the Care Plan requirement. This Care Plan has been established and  reviewed with the Patient.    Counseling Resources:  ATP IV Guidelines  Pooled Cohorts Equation Calculator  Breast Cancer Risk Calculator  Breast Cancer: Medication to Reduce Risk  FRAX Risk Assessment  ICSI Preventive Guidelines  Dietary Guidelines for Americans, 2010  USDA's MyPlate  ASA Prophylaxis  Lung CA Screening    José Lee MD  [facility]    Identified Health Risks:

## 2022-04-25 DIAGNOSIS — E78.5 HYPERLIPIDEMIA WITH TARGET LDL LESS THAN 130: ICD-10-CM

## 2022-04-25 DIAGNOSIS — I10 HYPERTENSION GOAL BP (BLOOD PRESSURE) < 150/90: ICD-10-CM

## 2022-04-25 RX ORDER — SIMVASTATIN 40 MG
40 TABLET ORAL AT BEDTIME
Qty: 90 TABLET | Refills: 1 | Status: SHIPPED | OUTPATIENT
Start: 2022-04-25 | End: 2022-10-12

## 2022-04-25 RX ORDER — LISINOPRIL AND HYDROCHLOROTHIAZIDE 20; 25 MG/1; MG/1
TABLET ORAL
Qty: 180 TABLET | Refills: 0 | Status: SHIPPED | OUTPATIENT
Start: 2022-04-25 | End: 2022-07-20

## 2022-04-25 NOTE — TELEPHONE ENCOUNTER
Routing refill request to provider for review/approval because:  Labs out of range:  sodium  Mimi Ochoa BSN, RN

## 2022-07-18 DIAGNOSIS — I10 HYPERTENSION GOAL BP (BLOOD PRESSURE) < 150/90: ICD-10-CM

## 2022-07-20 RX ORDER — LISINOPRIL AND HYDROCHLOROTHIAZIDE 20; 25 MG/1; MG/1
TABLET ORAL
Qty: 180 TABLET | Refills: 0 | Status: SHIPPED | OUTPATIENT
Start: 2022-07-20 | End: 2022-10-12

## 2022-10-12 DIAGNOSIS — I10 HYPERTENSION GOAL BP (BLOOD PRESSURE) < 150/90: ICD-10-CM

## 2022-10-12 DIAGNOSIS — E78.5 HYPERLIPIDEMIA WITH TARGET LDL LESS THAN 130: ICD-10-CM

## 2022-10-12 RX ORDER — SIMVASTATIN 40 MG
40 TABLET ORAL AT BEDTIME
Qty: 90 TABLET | Refills: 0 | Status: SHIPPED | OUTPATIENT
Start: 2022-10-12 | End: 2023-01-03

## 2022-10-12 RX ORDER — LISINOPRIL AND HYDROCHLOROTHIAZIDE 20; 25 MG/1; MG/1
TABLET ORAL
Qty: 180 TABLET | Refills: 0 | Status: SHIPPED | OUTPATIENT
Start: 2022-10-12 | End: 2023-01-09

## 2022-10-31 ENCOUNTER — OFFICE VISIT (OUTPATIENT)
Dept: FAMILY MEDICINE | Facility: CLINIC | Age: 72
End: 2022-10-31
Payer: COMMERCIAL

## 2022-10-31 ENCOUNTER — NURSE TRIAGE (OUTPATIENT)
Dept: NURSING | Facility: CLINIC | Age: 72
End: 2022-10-31

## 2022-10-31 VITALS
SYSTOLIC BLOOD PRESSURE: 148 MMHG | BODY MASS INDEX: 26.91 KG/M2 | RESPIRATION RATE: 20 BRPM | TEMPERATURE: 97.2 F | DIASTOLIC BLOOD PRESSURE: 64 MMHG | WEIGHT: 198.4 LBS | HEART RATE: 73 BPM | OXYGEN SATURATION: 97 %

## 2022-10-31 DIAGNOSIS — J02.9 ACUTE PHARYNGITIS, UNSPECIFIED ETIOLOGY: Primary | ICD-10-CM

## 2022-10-31 LAB
DEPRECATED S PYO AG THROAT QL EIA: NEGATIVE
GROUP A STREP BY PCR: NOT DETECTED

## 2022-10-31 PROCEDURE — 99213 OFFICE O/P EST LOW 20 MIN: CPT | Performed by: FAMILY MEDICINE

## 2022-10-31 PROCEDURE — 87651 STREP A DNA AMP PROBE: CPT | Performed by: FAMILY MEDICINE

## 2022-10-31 ASSESSMENT — PAIN SCALES - GENERAL: PAINLEVEL: NO PAIN (0)

## 2022-10-31 NOTE — TELEPHONE ENCOUNTER
"Swollen gland  Cough for a week  Sore throat yesterday    Triage Call:    Pt and patient's wife calling to report that patient had a sore throat and cough, but now he has a swollen gland on the right side and a painful right ear    He has been gargling and took some ibuprofen last night    No throat pain without swallowing  Swallowing 5/10    Disposition: See PCP within 24 hours. Pt was given the care advice and plans to go to the nearby [facility] clinic. Pt's wife also wanted to be transferred to [facility] to check for a clinic appt.     Dot Cohn RN  [facility] Nurse Advisor 8:06 AM 10/31/2022        Reason for Disposition    Earache also present    Additional Information    Negative: SEVERE difficulty breathing (e.g., struggling for each breath, speaks in single words, stridor)    Negative: Sounds like a life-threatening emergency to the triager    Negative: [1] Drooling or spitting out saliva (because can't swallow) AND [2] normal breathing    Negative: Unable to open mouth completely    Negative: [1] Difficulty breathing AND [2] not severe    Negative: Fever > 104 F (40 C)    Negative: [1] Refuses to drink anything AND [2] for > 12 hours    Negative: [1] Drinking very little AND [2] dehydration suspected (e.g., no urine > 12 hours, very dry mouth, very lightheaded)    Negative: Patient sounds very sick or weak to the triager    Negative: SEVERE (e.g., excruciating) throat pain    Negative: [1] Pus on tonsils (back of throat) AND [2]  fever AND [3] swollen neck lymph nodes (\"glands\")    Negative: [1] Rash AND [2] widespread (especially chest and abdomen)    Negative: [1] Diagnosed strep throat AND [2] taking antibiotic AND [3] symptoms continue    Negative: Throat culture results, call about    Negative: Productive cough is main symptom    Negative: Non-productive cough is main symptom    Negative: Hoarseness is main symptom    Negative: Runny nose is main symptom    Negative: Uvula swelling is main " symptom    Protocols used: SORE THROAT-A-AH

## 2022-10-31 NOTE — PROGRESS NOTES
Answers for HPI/ROS submitted by the patient on 10/31/2022  How many servings of fruits and vegetables do you eat daily?: 2-3  On average, how many sweetened beverages do you drink each day (Examples: soda, juice, sweet tea, etc.  Do NOT count diet or artificially sweetened beverages)?: 1  How many minutes a day do you exercise enough to make your heart beat faster?: 60 or more  How many days a week do you exercise enough to make your heart beat faster?: 7  How many days per week do you miss taking your medication?: 0  What is the reason for your visit today?: sore throat  When did your symptoms begin?: 1-3 days ago      SUBJECTIVE:  Harvey Guevara is a 72 year old male who presents with the following concerns;              Symptoms: cc Present Absent Comment   Fever/Chills   x    Fatigue   x    Muscle Aches   x    Eye Irritation   x    Sneezing   x    Nasal Camilo/Drg  x     Sinus Pressure/Pain   x    Loss of smell   x    Dental pain   x    Sore Throat  x  Right side is worse    Swollen Glands  x     Ear Pain/Fullness  x  Right ear    Cough  x     Wheeze   x    Chest Pain   x    Shortness of breath   x    Rash   x    Other         Symptom duration:  since yesterday    Sympom severity:  mild at this time    Treatments tried:  salt water    Contacts:  none      covid neg yesterday    Medications updated and reviewed.  Past, family and surgical history is updated and reviewed in the record.  ROS:  Other than noted above, general, HEENT, respiratory, cardiac and gastrointestinal systems are negative.  OBJECTIVE:  BP (!) 148/64   Pulse 73   Temp 97.2  F (36.2  C) (Tympanic)   Resp 20   Wt 90 kg (198 lb 6.4 oz)   SpO2 97%   BMI 26.91 kg/m    GENERAL: Pleasant and interactive.  Alert and oriented x 3.  No acute distress.   HEENT: Diffuse pharyngeal erythema. Tonsils mild erythema, no exudate.  Sclera, lids and conjunctiva are normal.  Nose and ears clear.  NECK: Mild adenopathy.  CHEST:  clear, no wheezing or  rales. Normal symmetric air entry throughout both lung fields. No chest wall deformities or tenderness.  HEART:  S1 and S2 normal, no murmurs, clicks, gallops or rubs. Regular rate and rhythm.  SKIN:  Only benign skin findings. No unusual rashes or suspicious skin lesions noted. Nails appear normal.    RST - negative.  24hr culture pending.      Assessment:  (J02.9) Acute pharyngitis, unspecified etiology  (primary encounter diagnosis)  Comment: follow-up strep pcr  Plan: Streptococcus A Rapid Screen w/Reflex to PCR -         Clinic Collect, Group A Streptococcus PCR         Throat Swab        Reviewed symptomatic relief.

## 2022-12-05 ENCOUNTER — DOCUMENTATION ONLY (OUTPATIENT)
Dept: LAB | Facility: CLINIC | Age: 72
End: 2022-12-05

## 2022-12-05 DIAGNOSIS — Z12.5 SCREENING PSA (PROSTATE SPECIFIC ANTIGEN): ICD-10-CM

## 2022-12-05 DIAGNOSIS — E78.5 HYPERLIPIDEMIA WITH TARGET LDL LESS THAN 130: Primary | ICD-10-CM

## 2022-12-05 DIAGNOSIS — I10 ESSENTIAL HYPERTENSION WITH GOAL BLOOD PRESSURE LESS THAN 140/90: ICD-10-CM

## 2022-12-05 NOTE — PROGRESS NOTES
Harvey Guevara has an upcoming lab appointment:    Future Appointments   Date Time Provider Department Center   12/16/2022  8:15 AM [facility]   12/22/2022  9:30 AM José Lee MD [facility]         There is no order available. Please review and place either future orders or HMPO (Review of Health Maintenance Protocol Orders), as appropriate.    Health Maintenance Due   Topic     ANNUAL REVIEW OF HM ORDERS      Heath CALDWELLT

## 2022-12-16 ENCOUNTER — LAB (OUTPATIENT)
Dept: LAB | Facility: CLINIC | Age: 72
End: 2022-12-16
Payer: COMMERCIAL

## 2022-12-16 DIAGNOSIS — Z12.5 SCREENING PSA (PROSTATE SPECIFIC ANTIGEN): ICD-10-CM

## 2022-12-16 DIAGNOSIS — I10 ESSENTIAL HYPERTENSION WITH GOAL BLOOD PRESSURE LESS THAN 140/90: ICD-10-CM

## 2022-12-16 DIAGNOSIS — E78.5 HYPERLIPIDEMIA WITH TARGET LDL LESS THAN 130: ICD-10-CM

## 2022-12-16 LAB
ALBUMIN SERPL-MCNC: 4.1 G/DL (ref 3.4–5)
ANION GAP SERPL CALCULATED.3IONS-SCNC: 5 MMOL/L (ref 3–14)
BUN SERPL-MCNC: 13 MG/DL (ref 7–30)
CALCIUM SERPL-MCNC: 9.6 MG/DL (ref 8.5–10.1)
CHLORIDE BLD-SCNC: 98 MMOL/L (ref 94–109)
CHOLEST SERPL-MCNC: 219 MG/DL
CO2 SERPL-SCNC: 30 MMOL/L (ref 20–32)
CREAT SERPL-MCNC: 0.86 MG/DL (ref 0.66–1.25)
FASTING STATUS PATIENT QL REPORTED: YES
GFR SERPL CREATININE-BSD FRML MDRD: >90 ML/MIN/1.73M2
GLUCOSE BLD-MCNC: 109 MG/DL (ref 70–99)
HDLC SERPL-MCNC: 75 MG/DL
LDLC SERPL CALC-MCNC: 104 MG/DL
NONHDLC SERPL-MCNC: 144 MG/DL
PHOSPHATE SERPL-MCNC: 3.1 MG/DL (ref 2.5–4.5)
POTASSIUM BLD-SCNC: 3.4 MMOL/L (ref 3.4–5.3)
PSA SERPL-MCNC: 1.02 UG/L (ref 0–4)
SODIUM SERPL-SCNC: 133 MMOL/L (ref 133–144)
TRIGL SERPL-MCNC: 199 MG/DL

## 2022-12-16 PROCEDURE — 36415 COLL VENOUS BLD VENIPUNCTURE: CPT

## 2022-12-16 PROCEDURE — G0103 PSA SCREENING: HCPCS

## 2022-12-16 PROCEDURE — 80061 LIPID PANEL: CPT

## 2022-12-16 PROCEDURE — 80069 RENAL FUNCTION PANEL: CPT

## 2023-01-07 DIAGNOSIS — I10 HYPERTENSION GOAL BP (BLOOD PRESSURE) < 150/90: ICD-10-CM

## 2023-01-09 RX ORDER — LISINOPRIL AND HYDROCHLOROTHIAZIDE 20; 25 MG/1; MG/1
TABLET ORAL
Qty: 180 TABLET | Refills: 0 | Status: SHIPPED | OUTPATIENT
Start: 2023-01-09 | End: 2023-01-24

## 2023-01-23 NOTE — PATIENT INSTRUCTIONS
Patient Education   Personalized Prevention Plan  You are due for the preventive services outlined below.  Your care team is available to assist you in scheduling these services.  If you have already completed any of these items, please share that information with your care team to update in your medical record.  Health Maintenance Due   Topic Date Due     ANNUAL REVIEW OF HM ORDERS  Never done     LUNG CANCER SCREENING  Never done     Annual Wellness Visit  12/14/2022     Eye Exam  12/14/2022     FALL RISK ASSESSMENT  12/14/2022     PHQ-2 (once per calendar year)  01/01/2023

## 2023-01-23 NOTE — PROGRESS NOTES
"SUBJECTIVE:   Harvey is a 72 year old who presents for Preventive Visit.  History htn, high cholesterol, psoriasis, cold sores and ed.  coloonoscopy 2018 normal.  Outside blood pressure ok. Lots of ice fishing.   No chest pain or shortness of breath. No nausea, vomiting or diarrhea or blackbloody stools. No urine changes or hematuriia. No testicle pain/masses/hernia.   and home.  Kids doing ok. Seeing a lot and grandkids x2 -26,22.   No rashes/moles changes. VitaminD.   Psoriasis stable.   Patient has been advised of split billing requirements and indicates understanding: Yes  Are you in the first 12 months of your Medicare coverage?  No    Healthy Habits:     In general, how would you rate your overall health?  Excellent    Frequency of exercise:  4-5 days/week    Duration of exercise:  30-45 minutes    Do you usually eat at least 4 servings of fruit and vegetables a day, include whole grains    & fiber and avoid regularly eating high fat or \"junk\" foods?  Yes    Taking medications regularly:  Yes    Medication side effects:  None    Ability to successfully perform activities of daily living:  No assistance needed    Home Safety:  No safety concerns identified    Hearing Impairment:  No hearing concerns    In the past 6 months, have you been bothered by leaking of urine?  No    In general, how would you rate your overall mental or emotional health?  Excellent      PHQ-2 Total Score: 0    Additional concerns today:  No      Have you ever done Advance Care Planning? (For example, a Health Directive, POLST, or a discussion with a medical provider or your loved ones about your wishes): Yes, patient states has an Advance Care Planning document and will bring a copy to the clinic.    Normal conversational hearing.   Fall risk  Fallen 2 or more times in the past year?: No  Any fall with injury in the past year?: No    Cognitive Screening   1) Repeat 3 items (Leader, Season, Table)    2) Clock draw: NORMAL  3) 3 " item recall: Recalls 3 objects  Results: 3 items recalled: COGNITIVE IMPAIRMENT LESS LIKELY    Mini-CogTM Copyright [employer]. Licensed by the author for use in [facility]; reprinted with permission ([email or web address]). All rights reserved.      Do you have sleep apnea, excessive snoring or daytime drowsiness?: yes    Reviewed and updated as needed this visit by clinical staff   Tobacco  Allergies  Meds              Reviewed and updated as needed this visit by Provider                 Social History     Tobacco Use     Smoking status: Former     Smokeless tobacco: Never     Tobacco comments:     Lives in smoke free household   Substance Use Topics     Alcohol use: Yes     Alcohol/week: 0.0 standard drinks     Comment: 1-2 beers at night          Alcohol Use 1/24/2023   Prescreen: >3 drinks/day or >7 drinks/week? No   Prescreen: >3 drinks/day or >7 drinks/week? -           Patient Care Team:  José Lee MD as PCP - General (Family Practice)  José Lee MD as Assigned PCP  Chery Olson OD as Assigned Surgical Provider    The following health maintenance items are reviewed in Epic and correct as of today:  Health Maintenance   Topic Date Due     ANNUAL REVIEW OF HM ORDERS  Never done     LUNG CANCER SCREENING  Never done     MEDICARE ANNUAL WELLNESS VISIT  12/14/2022     EYE EXAM  12/14/2022     FALL RISK ASSESSMENT  01/24/2024     ADVANCE CARE PLANNING  12/10/2025     LIPID  12/16/2027     COLORECTAL CANCER SCREENING  03/14/2028     DTAP/TDAP/TD IMMUNIZATION (3 - Td or Tdap) 07/16/2028     HEPATITIS C SCREENING  Completed     PHQ-2 (once per calendar year)  Completed     INFLUENZA VACCINE  Completed     Pneumococcal Vaccine: 65+ Years  Completed     ZOSTER IMMUNIZATION  Completed     AORTIC ANEURYSM SCREENING (SYSTEM ASSIGNED)  Completed     COVID-19 Vaccine  Completed     IPV IMMUNIZATION  Aged Out     MENINGITIS IMMUNIZATION  Aged Out         Review of Systems   Constitutional: Negative  "for chills and fever.   HENT: Negative for congestion, ear pain, hearing loss and sore throat.    Eyes: Negative for pain and visual disturbance.   Respiratory: Negative for cough and shortness of breath.    Cardiovascular: Negative for chest pain, palpitations and peripheral edema.   Gastrointestinal: Negative for abdominal pain, constipation, diarrhea, heartburn, hematochezia and nausea.   Genitourinary: Negative for dysuria, frequency, genital sores, hematuria and urgency.   Musculoskeletal: Positive for arthralgias. Negative for joint swelling and myalgias.   Skin: Negative for rash.   Neurological: Negative for dizziness, weakness, headaches and paresthesias.   Psychiatric/Behavioral: Negative for mood changes. The patient is not nervous/anxious.        OBJECTIVE:   Physical Exam   /72   Pulse 75   Temp 97.5  F (36.4  C) (Oral)   Resp 18   Ht 1.816 m (5' 11.5\")   Wt 94.2 kg (207 lb 9.6 oz)   SpO2 98%   BMI 28.55 kg/m     GENERAL: healthy, alert and no distress  EYES: Eyes grossly normal to inspection, PERRL and conjunctivae and sclerae normal  HENT: ear canals and TM's normal, nose and mouth without ulcers or lesions  NECK: no adenopathy, no asymmetry, masses, or scars and thyroid normal to palpation  RESP: lungs clear to auscultation - no rales, rhonchi or wheezes  BREAST: normal without masses, tenderness or nipple discharge and no palpable axillary masses or adenopathy  CV: regular rate and rhythm, normal S1 S2, no S3 or S4, no murmur, click or rub, no peripheral edema and peripheral pulses strong  ABDOMEN: soft, nontender, no hepatosplenomegaly, no masses and bowel sounds normal   (male): patient deferred /rectal exams  MS: no gross musculoskeletal defects noted, no edema  SKIN: no suspicious lesions or rashes  NEURO: Normal strength and tone, mentation intact and speech normal  PSYCH: mentation appears normal, affect normal/bright  LYMPH: no cervical, supraclavicular, axillary " adenopathy    ASSESSMENT / PLAN:   ASSESSMENT / PLAN:  (Z00.00) Encounter for Medicare annual wellness exam  (primary encounter diagnosis)  Comment: generally healthy and normal exam/labs. Memory ok and no falls.  Plan: Reviewed self mole/testicle check handout.  Continue exercise and vitaminD.     (B00.1) Recurrent cold sores  Comment: stable  Plan: valACYclovir (VALTREX) 1000 mg tablet,         OFFICE/OUTPT VISIT,EST,LEVL IV        Prn.     (I10) Hypertension goal BP (blood pressure) < 150/90  Comment: stable  Plan: lisinopril-hydrochlorothiazide (ZESTORETIC)         20-25 MG tablet, OFFICE/OUTPT VISIT,EST,LEVL IV        Continue exercise and self-monitor. Chest pain or shortness of breath to er. Return to clinic if worse. Reveiwed risks and side effects of medication      (E78.5) Hyperlipidemia with target LDL less than 130  Comment: stable  Plan: OFFICE/OUTPT VISIT,EST,LEVL IV        Lower carbs in diet. Continue lots of fish/meat.     (J30.9) Allergic rhinitis, unspecified seasonality, unspecified trigger  Comment: stable  Plan: loratadine (CLARITIN) 10 MG tablet,         OFFICE/OUTPT VISIT,EST,LEVL IV        Reveiwed risks and side effects of medication          Patient has been advised of split billing requirements and indicates understanding: Yes      COUNSELING:  Reviewed preventive health counseling, as reflected in patient instructions       Regular exercise       Healthy diet/nutrition       Vision screening       Dental care       Fall risk prevention       Alcohol Use        Colon cancer screening       Prostate cancer screening        He reports that he has quit smoking. He has never used smokeless tobacco.      Appropriate preventive services were discussed with this patient, including applicable screening as appropriate for cardiovascular disease, diabetes, osteopenia/osteoporosis, and glaucoma.  As appropriate for age/gender, discussed screening for colorectal cancer, prostate cancer, breast  cancer, and cervical cancer. Checklist reviewing preventive services available has been given to the patient.    Reviewed patients plan of care and provided an AVS. The Intermediate Care Plan ( asthma action plan, low back pain action plan, and migraine action plan) for Harvey meets the Care Plan requirement. This Care Plan has been established and reviewed with the Patient.          José Lee MD  [facility]    Identified Health Risks:

## 2023-01-24 ENCOUNTER — OFFICE VISIT (OUTPATIENT)
Dept: FAMILY MEDICINE | Facility: CLINIC | Age: 73
End: 2023-01-24
Payer: COMMERCIAL

## 2023-01-24 VITALS
HEIGHT: 72 IN | WEIGHT: 207.6 LBS | OXYGEN SATURATION: 98 % | RESPIRATION RATE: 18 BRPM | HEART RATE: 75 BPM | TEMPERATURE: 97.5 F | SYSTOLIC BLOOD PRESSURE: 131 MMHG | DIASTOLIC BLOOD PRESSURE: 72 MMHG | BODY MASS INDEX: 28.12 KG/M2

## 2023-01-24 DIAGNOSIS — B00.1 RECURRENT COLD SORES: ICD-10-CM

## 2023-01-24 DIAGNOSIS — E78.5 HYPERLIPIDEMIA WITH TARGET LDL LESS THAN 130: ICD-10-CM

## 2023-01-24 DIAGNOSIS — J30.9 ALLERGIC RHINITIS, UNSPECIFIED SEASONALITY, UNSPECIFIED TRIGGER: ICD-10-CM

## 2023-01-24 DIAGNOSIS — I10 HYPERTENSION GOAL BP (BLOOD PRESSURE) < 150/90: ICD-10-CM

## 2023-01-24 DIAGNOSIS — Z00.00 ENCOUNTER FOR MEDICARE ANNUAL WELLNESS EXAM: Primary | ICD-10-CM

## 2023-01-24 PROCEDURE — 99214 OFFICE O/P EST MOD 30 MIN: CPT | Mod: 25 | Performed by: FAMILY MEDICINE

## 2023-01-24 PROCEDURE — G0439 PPPS, SUBSEQ VISIT: HCPCS | Performed by: FAMILY MEDICINE

## 2023-01-24 RX ORDER — LORATADINE 10 MG/1
10 TABLET ORAL DAILY
Qty: 90 TABLET | Refills: 3 | Status: SHIPPED | OUTPATIENT
Start: 2023-01-24 | End: 2024-01-02

## 2023-01-24 RX ORDER — VALACYCLOVIR HYDROCHLORIDE 1 G/1
TABLET, FILM COATED ORAL
Qty: 24 TABLET | Refills: 2 | Status: SHIPPED | OUTPATIENT
Start: 2023-01-24 | End: 2024-01-25

## 2023-01-24 RX ORDER — LISINOPRIL AND HYDROCHLOROTHIAZIDE 20; 25 MG/1; MG/1
TABLET ORAL
Qty: 180 TABLET | Refills: 3 | Status: SHIPPED | OUTPATIENT
Start: 2023-01-24 | End: 2024-01-02

## 2023-01-24 ASSESSMENT — ENCOUNTER SYMPTOMS
NAUSEA: 0
FEVER: 0
CHILLS: 0
HEADACHES: 0
HEMATURIA: 0
PARESTHESIAS: 0
ABDOMINAL PAIN: 0
NERVOUS/ANXIOUS: 0
PALPITATIONS: 0
CONSTIPATION: 0
FREQUENCY: 0
JOINT SWELLING: 0
DYSURIA: 0
HEMATOCHEZIA: 0
DIZZINESS: 0
ARTHRALGIAS: 1
MYALGIAS: 0
EYE PAIN: 0
DIARRHEA: 0
SORE THROAT: 0
SHORTNESS OF BREATH: 0
HEARTBURN: 0
WEAKNESS: 0
COUGH: 0

## 2023-01-24 ASSESSMENT — PAIN SCALES - GENERAL: PAINLEVEL: NO PAIN (0)

## 2023-01-24 ASSESSMENT — ACTIVITIES OF DAILY LIVING (ADL): CURRENT_FUNCTION: NO ASSISTANCE NEEDED

## 2023-10-09 DIAGNOSIS — E78.5 HYPERLIPIDEMIA WITH TARGET LDL LESS THAN 130: ICD-10-CM

## 2023-10-09 RX ORDER — SIMVASTATIN 40 MG
40 TABLET ORAL AT BEDTIME
Qty: 90 TABLET | Refills: 0 | Status: SHIPPED | OUTPATIENT
Start: 2023-10-09 | End: 2023-10-15

## 2023-10-10 ENCOUNTER — OFFICE VISIT (OUTPATIENT)
Dept: OPTOMETRY | Facility: CLINIC | Age: 73
End: 2023-10-10
Payer: COMMERCIAL

## 2023-10-10 DIAGNOSIS — H52.4 PRESBYOPIA: ICD-10-CM

## 2023-10-10 DIAGNOSIS — H52.223 REGULAR ASTIGMATISM OF BOTH EYES: ICD-10-CM

## 2023-10-10 DIAGNOSIS — H25.13 NUCLEAR SCLEROTIC CATARACT OF BOTH EYES: ICD-10-CM

## 2023-10-10 DIAGNOSIS — Z01.01 VISION EXAM WITH ABNORMAL FINDINGS: Primary | ICD-10-CM

## 2023-10-10 PROCEDURE — 92014 COMPRE OPH EXAM EST PT 1/>: CPT | Performed by: OPTOMETRIST

## 2023-10-10 PROCEDURE — 92015 DETERMINE REFRACTIVE STATE: CPT | Performed by: OPTOMETRIST

## 2023-10-10 ASSESSMENT — REFRACTION_MANIFEST
OD_ADD: +2.50
OS_AXIS: 165
OS_ADD: +2.50
OS_SPHERE: +0.25
OD_AXIS: 170
OD_SPHERE: PLANO
OS_CYLINDER: +1.25
OS_CYLINDER: +1.25
OD_SPHERE: -0.25
OD_CYLINDER: +1.00
OD_CYLINDER: +1.00
OS_SPHERE: +0.50
OD_AXIS: 005
OS_AXIS: 168

## 2023-10-10 ASSESSMENT — KERATOMETRY
OD_AXISANGLE2_DEGREES: 180
OS_AXISANGLE2_DEGREES: 145
OD_K1POWER_DIOPTERS: 42.25
OS_K1POWER_DIOPTERS: 42.25
OS_K2POWER_DIOPTERS: 42.50
OD_K2POWER_DIOPTERS: 42.25

## 2023-10-10 ASSESSMENT — VISUAL ACUITY
OS_SC+: -2
OS_SC: 20/30
OD_SC+: -1
OS_CC: 20/25-2
METHOD: SNELLEN - LINEAR
CORRECTION_TYPE: GLASSES
OD_CC: 20/20-2
OD_SC: 20/25

## 2023-10-10 ASSESSMENT — CONF VISUAL FIELD
OS_SUPERIOR_NASAL_RESTRICTION: 0
OD_SUPERIOR_TEMPORAL_RESTRICTION: 0
OS_SUPERIOR_TEMPORAL_RESTRICTION: 0
OS_INFERIOR_TEMPORAL_RESTRICTION: 0
OD_INFERIOR_TEMPORAL_RESTRICTION: 0
OD_INFERIOR_NASAL_RESTRICTION: 0
METHOD: COUNTING FINGERS
OS_NORMAL: 1
OS_INFERIOR_NASAL_RESTRICTION: 0
OD_SUPERIOR_NASAL_RESTRICTION: 0
OD_NORMAL: 1

## 2023-10-10 ASSESSMENT — REFRACTION_WEARINGRX
SPECS_TYPE: OTC
OD_SPHERE: +2.50
OS_SPHERE: +2.50

## 2023-10-10 ASSESSMENT — TONOMETRY
OS_IOP_MMHG: 14
IOP_METHOD: APPLANATION
OD_IOP_MMHG: 14

## 2023-10-10 ASSESSMENT — SLIT LAMP EXAM - LIDS: COMMENTS: 1+ LID THICKENING

## 2023-10-10 ASSESSMENT — EXTERNAL EXAM - RIGHT EYE: OD_EXAM: NORMAL

## 2023-10-10 ASSESSMENT — EXTERNAL EXAM - LEFT EYE: OS_EXAM: NORMAL

## 2023-10-10 ASSESSMENT — CUP TO DISC RATIO
OD_RATIO: 0.3
OS_RATIO: 0.3

## 2023-10-10 NOTE — LETTER
10/10/2023         RE: Harvey Guevara  [address]  [place] [address]        Dear Colleague,    Thank you for referring your patient, Harvey Guevara, to the [facility]. Please see a copy of my visit note below.    Chief Complaint   Patient presents with     COMPREHENSIVE EYE EXAM         Last Eye Exam: 12/14/21  Dilated Previously: Yes    What are you currently using to see?  readers       Distance Vision Acuity: Satisfied with vision    Near Vision Acuity: Satisfied with vision while reading and using computer with readers, uses weaker ones on the computer     Eye Comfort: good  Do you use eye drops? : Yes: As needed for itching. Usually uses wife's Sy[employer]   Occupation or Hobbies: Retired     [employer]           Medical, surgical and family histories reviewed and updated 10/10/2023.       OBJECTIVE: See Ophthalmology exam    ASSESSMENT:    ICD-10-CM    1. Vision exam with abnormal findings  Z01.01       2. Regular astigmatism of both eyes  H52.223       3. Presbyopia  H52.4       4. Nuclear sclerotic cataract of both eyes  H25.13           PLAN:     Patient Instructions   Over the counter reading glasses as needed  Use your own bottle of eye drops- sample of Refresh Relieva given today  Return in 1 year for eye exam    Chery Olson, OD  [phone]                  Again, thank you for allowing me to participate in the care of your patient.        Sincerely,        Chery Olson, OD

## 2023-10-10 NOTE — PATIENT INSTRUCTIONS
Over the counter reading glasses as needed  Use your own bottle of eye drops- sample of Refresh Relieva given today  Return in 1 year for eye exam    Chery Olson, OD  [phone]

## 2023-10-10 NOTE — PROGRESS NOTES
Chief Complaint   Patient presents with    COMPREHENSIVE EYE EXAM         Last Eye Exam: 12/14/21  Dilated Previously: Yes    What are you currently using to see?  readers       Distance Vision Acuity: Satisfied with vision    Near Vision Acuity: Satisfied with vision while reading and using computer with readers, uses weaker ones on the computer     Eye Comfort: good  Do you use eye drops? : Yes: As needed for itching. Usually uses wife's Systane   Occupation or Hobbies: Retired     [employer]           Medical, surgical and family histories reviewed and updated 10/10/2023.       OBJECTIVE: See Ophthalmology exam    ASSESSMENT:    ICD-10-CM    1. Vision exam with abnormal findings  Z01.01       2. Regular astigmatism of both eyes  H52.223       3. Presbyopia  H52.4       4. Nuclear sclerotic cataract of both eyes  H25.13           PLAN:     Patient Instructions   Over the counter reading glasses as needed  Use your own bottle of eye drops- sample of Refresh Relieva given today  Return in 1 year for eye exam    Chery Olson, OD  [phone]

## 2023-10-12 ENCOUNTER — TELEPHONE (OUTPATIENT)
Dept: FAMILY MEDICINE | Facility: CLINIC | Age: 73
End: 2023-10-12
Payer: COMMERCIAL

## 2023-10-12 DIAGNOSIS — E78.5 HYPERLIPIDEMIA WITH TARGET LDL LESS THAN 130: ICD-10-CM

## 2023-10-12 NOTE — TELEPHONE ENCOUNTER
Patient is needing a refill on his Simvastatin 40mg. Last written 1/3/23 #90 with 2 refills. He will be out of meds soon.     Thank You  Alisha Oneil, [facility]  [phone]

## 2023-10-15 RX ORDER — SIMVASTATIN 40 MG
40 TABLET ORAL AT BEDTIME
Qty: 90 TABLET | Refills: 0 | Status: SHIPPED | OUTPATIENT
Start: 2023-10-15 | End: 2024-01-25

## 2024-01-02 DIAGNOSIS — I10 HYPERTENSION GOAL BP (BLOOD PRESSURE) < 150/90: ICD-10-CM

## 2024-01-02 DIAGNOSIS — J30.9 ALLERGIC RHINITIS, UNSPECIFIED SEASONALITY, UNSPECIFIED TRIGGER: ICD-10-CM

## 2024-01-02 RX ORDER — LISINOPRIL AND HYDROCHLOROTHIAZIDE 20; 25 MG/1; MG/1
TABLET ORAL
Qty: 180 TABLET | Refills: 0 | Status: SHIPPED | OUTPATIENT
Start: 2024-01-02 | End: 2024-01-25

## 2024-01-02 RX ORDER — LORATADINE 10 MG/1
10 TABLET ORAL DAILY
Qty: 90 TABLET | Refills: 0 | Status: SHIPPED | OUTPATIENT
Start: 2024-01-02 | End: 2024-04-01

## 2024-01-06 ENCOUNTER — DOCUMENTATION ONLY (OUTPATIENT)
Dept: FAMILY MEDICINE | Facility: CLINIC | Age: 74
End: 2024-01-06
Payer: COMMERCIAL

## 2024-01-06 DIAGNOSIS — I10 HYPERTENSION GOAL BP (BLOOD PRESSURE) < 150/90: ICD-10-CM

## 2024-01-06 DIAGNOSIS — E78.5 HYPERLIPIDEMIA WITH TARGET LDL LESS THAN 130: Primary | ICD-10-CM

## 2024-01-06 NOTE — PROGRESS NOTES
Harvey Guevara has an upcoming lab appointment:    Future Appointments   Date Time Provider Department Center   1/18/2024  8:15 AM AN LAB [facility]   1/25/2024  8:30 AM [facility]       There is no order available. Please review and place either future orders or HMPO (Review of Health Maintenance Protocol Orders), as appropriate.    Health Maintenance Due   Topic    ANNUAL REVIEW OF HM ORDERS      Heath CALDWELLT

## 2024-01-18 ENCOUNTER — LAB (OUTPATIENT)
Dept: LAB | Facility: CLINIC | Age: 74
End: 2024-01-18
Payer: COMMERCIAL

## 2024-01-18 DIAGNOSIS — I10 HYPERTENSION GOAL BP (BLOOD PRESSURE) < 150/90: ICD-10-CM

## 2024-01-18 DIAGNOSIS — E78.5 HYPERLIPIDEMIA WITH TARGET LDL LESS THAN 130: ICD-10-CM

## 2024-01-18 PROCEDURE — 80061 LIPID PANEL: CPT

## 2024-01-18 PROCEDURE — 80069 RENAL FUNCTION PANEL: CPT

## 2024-01-18 PROCEDURE — 36415 COLL VENOUS BLD VENIPUNCTURE: CPT

## 2024-01-19 LAB
ALBUMIN SERPL BCG-MCNC: 4.4 G/DL (ref 3.5–5.2)
ANION GAP SERPL CALCULATED.3IONS-SCNC: 9 MMOL/L (ref 7–15)
BUN SERPL-MCNC: 10 MG/DL (ref 8–23)
CALCIUM SERPL-MCNC: 9.5 MG/DL (ref 8.8–10.2)
CHLORIDE SERPL-SCNC: 92 MMOL/L (ref 98–107)
CHOLEST SERPL-MCNC: 195 MG/DL
CREAT SERPL-MCNC: 0.95 MG/DL (ref 0.67–1.17)
DEPRECATED HCO3 PLAS-SCNC: 30 MMOL/L (ref 22–29)
EGFRCR SERPLBLD CKD-EPI 2021: 84 ML/MIN/1.73M2
FASTING STATUS PATIENT QL REPORTED: YES
GLUCOSE SERPL-MCNC: 98 MG/DL (ref 70–99)
HDLC SERPL-MCNC: 64 MG/DL
LDLC SERPL CALC-MCNC: 99 MG/DL
NONHDLC SERPL-MCNC: 131 MG/DL
PHOSPHATE SERPL-MCNC: 3.2 MG/DL (ref 2.5–4.5)
POTASSIUM SERPL-SCNC: 3.5 MMOL/L (ref 3.4–5.3)
SODIUM SERPL-SCNC: 131 MMOL/L (ref 135–145)
TRIGL SERPL-MCNC: 159 MG/DL

## 2024-01-25 ENCOUNTER — OFFICE VISIT (OUTPATIENT)
Dept: FAMILY MEDICINE | Facility: CLINIC | Age: 74
End: 2024-01-25
Payer: COMMERCIAL

## 2024-01-25 VITALS
HEART RATE: 67 BPM | TEMPERATURE: 98 F | RESPIRATION RATE: 16 BRPM | BODY MASS INDEX: 27.9 KG/M2 | OXYGEN SATURATION: 98 % | SYSTOLIC BLOOD PRESSURE: 138 MMHG | WEIGHT: 206 LBS | HEIGHT: 72 IN | DIASTOLIC BLOOD PRESSURE: 76 MMHG

## 2024-01-25 DIAGNOSIS — B00.1 RECURRENT COLD SORES: ICD-10-CM

## 2024-01-25 DIAGNOSIS — Z00.00 ENCOUNTER FOR MEDICARE ANNUAL WELLNESS EXAM: Primary | ICD-10-CM

## 2024-01-25 DIAGNOSIS — Z82.49 FHX: CORONARY ARTERY DISEASE: ICD-10-CM

## 2024-01-25 DIAGNOSIS — I10 HYPERTENSION GOAL BP (BLOOD PRESSURE) < 150/90: ICD-10-CM

## 2024-01-25 DIAGNOSIS — R06.02 SOB (SHORTNESS OF BREATH): ICD-10-CM

## 2024-01-25 DIAGNOSIS — E78.5 HYPERLIPIDEMIA WITH TARGET LDL LESS THAN 130: ICD-10-CM

## 2024-01-25 PROCEDURE — G0439 PPPS, SUBSEQ VISIT: HCPCS | Performed by: FAMILY MEDICINE

## 2024-01-25 PROCEDURE — 99214 OFFICE O/P EST MOD 30 MIN: CPT | Mod: 25 | Performed by: FAMILY MEDICINE

## 2024-01-25 RX ORDER — LISINOPRIL AND HYDROCHLOROTHIAZIDE 20; 25 MG/1; MG/1
TABLET ORAL
Qty: 180 TABLET | Refills: 3 | Status: SHIPPED | OUTPATIENT
Start: 2024-01-25

## 2024-01-25 RX ORDER — SIMVASTATIN 40 MG
40 TABLET ORAL AT BEDTIME
Qty: 90 TABLET | Refills: 3 | Status: SHIPPED | OUTPATIENT
Start: 2024-01-25

## 2024-01-25 RX ORDER — RESPIRATORY SYNCYTIAL VIRUS VACCINE 120MCG/0.5
0.5 KIT INTRAMUSCULAR ONCE
Qty: 1 EACH | Refills: 0 | Status: CANCELLED | OUTPATIENT
Start: 2024-01-25 | End: 2024-01-25

## 2024-01-25 RX ORDER — VALACYCLOVIR HYDROCHLORIDE 1 G/1
TABLET, FILM COATED ORAL
Qty: 60 TABLET | Refills: 2 | Status: SHIPPED | OUTPATIENT
Start: 2024-01-25

## 2024-01-25 ASSESSMENT — ENCOUNTER SYMPTOMS
ABDOMINAL PAIN: 0
CONSTIPATION: 0
WEAKNESS: 0
HEMATOCHEZIA: 0
NERVOUS/ANXIOUS: 0
PALPITATIONS: 0
MYALGIAS: 0
PARESTHESIAS: 0
DIZZINESS: 0
ARTHRALGIAS: 0
HEARTBURN: 0
HEMATURIA: 0
SORE THROAT: 0
HEADACHES: 0
JOINT SWELLING: 0
COUGH: 0
DIARRHEA: 0
DYSURIA: 0
EYE PAIN: 0
FREQUENCY: 0
CHILLS: 0
NAUSEA: 0
SHORTNESS OF BREATH: 0
FEVER: 0

## 2024-01-25 ASSESSMENT — ACTIVITIES OF DAILY LIVING (ADL): CURRENT_FUNCTION: NO ASSISTANCE NEEDED

## 2024-01-25 ASSESSMENT — PAIN SCALES - GENERAL: PAINLEVEL: NO PAIN (0)

## 2024-01-25 NOTE — PATIENT INSTRUCTIONS
Patient Education   Personalized Prevention Plan  You are due for the preventive services outlined below.  Your care team is available to assist you in scheduling these services.  If you have already completed any of these items, please share that information with your care team to update in your medical record.  Health Maintenance Due   Topic Date Due     ANNUAL REVIEW OF HM ORDERS  Never done     LUNG CANCER SCREENING  Never done     RSV VACCINE (Pregnancy & 60+) (1 - 1-dose 60+ series) Never done     Annual Wellness Visit  01/24/2024

## 2024-01-25 NOTE — PROGRESS NOTES
"Preventive Care Visit  [facility]  José Lee MD, Family Medicine  Jan 25, 2024      SUBJECTIVE:   Harvey is a 73 year old, presenting for the following:  Wellness Visit  History htn, high cholesterol, psoriasis, ALLERGIC RHINITIS, cold sores and ed.  coloonoscopy 2018 normal.  Outside blood pressure ok. Lots of ice fishing.   No chest pain or shortness of breath. No nausea, vomiting or diarrhea or blackbloody stools. No urine changes or hematuriia. No testicle pain/masses/hernia.   and home.  Kids doing ok. Seeing a lot and grandkids x2 -26,22.   No rashes/moles changes. VitaminD.   Psoriasis stable.  Memory doing ok. No falls.   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   Outside blood pressure readings 128/75.   2 older brothers with cad. No cardiac issues.         1/25/2024     8:20 AM   Additional Questions   Roomed by FELIX Casanova CMA     Are you in the first 12 months of your Medicare coverage?  No    Healthy Habits:     In general, how would you rate your overall health?  Excellent    Frequency of exercise:  2-3 days/week    Duration of exercise:  15-30 minutes    Do you usually eat at least 4 servings of fruit and vegetables a day, include whole grains    & fiber and avoid regularly eating high fat or \"junk\" foods?  Yes    Taking medications regularly:  Yes    Medication side effects:  None    Ability to successfully perform activities of daily living:  No assistance needed    Home Safety:  No safety concerns identified    Hearing Impairment:  No hearing concerns    In the past 6 months, have you been bothered by leaking of urine?  No    In general, how would you rate your overall mental or emotional health?  Excellent    Additional concerns today:  No      Today's PHQ-2 Score:       1/25/2024     8:08 AM   PHQ-2 ( 1999 [employer])   Q1: Little interest or pleasure in doing things 0   Q2: Feeling down, depressed or hopeless 0   PHQ-2 Score 0   Q1: Little interest or pleasure in doing " things Not at all   Q2: Feeling down, depressed or hopeless Not at all   PHQ-2 Score 0           Have you ever done Advance Care Planning? (For example, a Health Directive, POLST, or a discussion with a medical provider or your loved ones about your wishes): Yes, patient states has an Advance Care Planning document and will bring a copy to the clinic.    Normal conversational hearing  Fall risk  Fallen 2 or more times in the past year?: No  Any fall with injury in the past year?: No    Cognitive Screening   1) Repeat 3 items (Leader, Season, Table)    2) Clock draw: NORMAL  3) 3 item recall: Recalls 3 objects  Results: 3 items recalled: COGNITIVE IMPAIRMENT LESS LIKELY    Mini-CogTM Copyright [employer]. Licensed by the author for use in [facility]; reprinted with permission (soanita@[facility]). All rights reserved.      Do you have sleep apnea, excessive snoring or daytime drowsiness? : no    Reviewed and updated as needed this visit by clinical staff                  Reviewed and updated as needed this visit by Provider                  Social History     Tobacco Use    Smoking status: Former    Smokeless tobacco: Never    Tobacco comments:     Lives in smoke free household   Substance Use Topics    Alcohol use: Yes     Alcohol/week: 0.0 standard drinks of alcohol     Comment: 1-2 beers at night              1/25/2024     8:08 AM   Alcohol Use   Prescreen: >3 drinks/day or >7 drinks/week? No     Do you have a current opioid prescription? No  Do you use any other controlled substances or medications that are not prescribed by a provider? None              Current providers sharing in care for this patient include:   Patient Care Team:  José Lee MD as PCP - General (Family Practice)  José Lee MD as Assigned PCP  Chery Olson OD (Optometry)  Chery Olson OD as Assigned Surgical Provider    The following health maintenance items are reviewed in Epic and correct as of today:  Health  Maintenance   Topic Date Due    ANNUAL REVIEW OF HM ORDERS  Never done    LUNG CANCER SCREENING  Never done    RSV VACCINE (Pregnancy & 60+) (1 - 1-dose 60+ series) Never done    MEDICARE ANNUAL WELLNESS VISIT  01/24/2024    EYE EXAM  10/10/2024    FALL RISK ASSESSMENT  01/25/2025    ADVANCE CARE PLANNING  01/24/2028    COLORECTAL CANCER SCREENING  03/14/2028    DTAP/TDAP/TD IMMUNIZATION (3 - Td or Tdap) 07/16/2028    LIPID  01/18/2029    HEPATITIS C SCREENING  Completed    PHQ-2 (once per calendar year)  Completed    INFLUENZA VACCINE  Completed    Pneumococcal Vaccine: 65+ Years  Completed    ZOSTER IMMUNIZATION  Completed    AORTIC ANEURYSM SCREENING (SYSTEM ASSIGNED)  Completed    COVID-19 Vaccine  Completed    IPV IMMUNIZATION  Aged Out    HPV IMMUNIZATION  Aged Out    MENINGITIS IMMUNIZATION  Aged Out    RSV MONOCLONAL ANTIBODY  Aged Out             Review of Systems   Constitutional:  Negative for chills and fever.   HENT:  Negative for congestion, ear pain, hearing loss and sore throat.    Eyes:  Negative for pain and visual disturbance.   Respiratory:  Negative for cough and shortness of breath.    Cardiovascular:  Negative for chest pain and palpitations.   Gastrointestinal:  Negative for abdominal pain, constipation, diarrhea and nausea.   Genitourinary:  Negative for dysuria, frequency, genital sores, hematuria, penile discharge and urgency.   Musculoskeletal:  Negative for arthralgias, joint swelling and myalgias.   Skin:  Negative for rash.   Neurological:  Negative for dizziness, weakness and headaches.   Psychiatric/Behavioral:  The patient is not nervous/anxious.       Blood in stool: No  heartburn: No  peripheral edema: No  mood changes: No  Skin sensation changes: No  impotence: Yes        OBJECTIVE:   /76   Pulse 67   Temp 98  F (36.7  C) (Oral)   Resp 16   Ht 1.829 m (6')   Wt 93.4 kg (206 lb)   SpO2 98%   BMI 27.94 kg/m     Physical Exam  GENERAL: alert and no distress  EYES: Eyes  grossly normal to inspection, PERRL and conjunctivae and sclerae normal  HENT: ear canals and TM's normal, nose and mouth without ulcers or lesions  NECK: no adenopathy, no asymmetry, masses, or scars  RESP: lungs clear to auscultation - no rales, rhonchi or wheezes  BREAST: normal without masses, tenderness or nipple discharge and no palpable axillary masses or adenopathy  CV: regular rate and rhythm, normal S1 S2, no S3 or S4, no murmur, click or rub, no peripheral edema   ABDOMEN: soft, nontender, no hepatosplenomegaly, no masses and bowel sounds normal   (male): patient deferred /rectal exams  MS: no gross musculoskeletal defects noted, no edema  SKIN: no suspicious lesions or rashes  NEURO: Normal strength and tone, mentation intact and speech normal  PSYCH: mentation appears normal, affect normal/bright  LYMPH: no cervical, supraclavicular, axillary adenopathy    ASSESSMENT / PLAN:   ASSESSMENT / PLAN:  (Z00.00) Encounter for Medicare annual wellness exam  (primary encounter diagnosis)  Comment: generally healthy and normal exam/labs. Home and memory ok. No falls.   Plan: Reviewed self mole/testicle check handout.  vitaminD.     (B00.1) Recurrent cold sores  Plan: valACYclovir (VALTREX) 1000 mg tablet        Reveiwed risks and side effects of medication      (I10) Hypertension goal BP (blood pressure) < 150/90  Comment: stable  Plan: lisinopril-hydrochlorothiazide (ZESTORETIC)         20-25 MG tablet, Echocardiogram Exercise Stress        Continue self-monitor/exercise. Return to clinic if worse    (E78.5) Hyperlipidemia with target LDL less than 130  Comment: stable  Plan: simvastatin (ZOCOR) 40 MG tablet,         Echocardiogram Exercise Stress        Continue exercise.     (Z82.49) FHx: coronary artery disease  Comment: 2 brothers.   Plan: Echocardiogram Exercise Stress        Patient would like stress testing    (R06.02) SOB (shortness of breath)  Comment: intermittent and possibly just out of  shapet  Plan: Echocardiogram Exercise Stress        Return to clinic if worsening/new symptoms.  To ER if a lot worse or chest pain. Check stress test.       Patient has been advised of split billing requirements and indicates understanding: Yes      Counseling  Reviewed preventive health counseling, as reflected in patient instructions       Regular exercise       Healthy diet/nutrition       Vision screening       Hearing screening       Dental care       Bladder control       Fall risk prevention       Alcohol Use         He reports that he has quit smoking. He has never used smokeless tobacco.      Appropriate preventive services were discussed with this patient, including applicable screening as appropriate for fall prevention, nutrition, physical activity, Tobacco-use cessation, weight loss and cognition.  Checklist reviewing preventive services available has been given to the patient.    Reviewed patients plan of care and provided an AVS. The Intermediate Care Plan ( asthma action plan, low back pain action plan, and migraine action plan) for Harvey meets the Care Plan requirement. This Care Plan has been established and reviewed with the Patient.          Signed Electronically by: José Lee MD    Identified Health Risks  I have reviewed Opioid Use Disorder and Substance Use Disorder risk factors and made any needed referrals.

## 2024-03-06 ENCOUNTER — TELEPHONE (OUTPATIENT)
Dept: FAMILY MEDICINE | Facility: CLINIC | Age: 74
End: 2024-03-06

## 2024-03-06 ENCOUNTER — ANCILLARY PROCEDURE (OUTPATIENT)
Dept: CARDIOLOGY | Facility: CLINIC | Age: 74
End: 2024-03-06
Attending: FAMILY MEDICINE
Payer: COMMERCIAL

## 2024-03-06 DIAGNOSIS — I10 HYPERTENSION GOAL BP (BLOOD PRESSURE) < 150/90: ICD-10-CM

## 2024-03-06 DIAGNOSIS — Z82.49 FHX: CORONARY ARTERY DISEASE: ICD-10-CM

## 2024-03-06 DIAGNOSIS — R06.02 SOB (SHORTNESS OF BREATH): ICD-10-CM

## 2024-03-06 DIAGNOSIS — E78.5 HYPERLIPIDEMIA WITH TARGET LDL LESS THAN 130: ICD-10-CM

## 2024-03-06 PROCEDURE — 99207 PR STATISTIC IV PUSH SINGLE INITIAL SUBSTANCE: CPT | Performed by: INTERNAL MEDICINE

## 2024-03-06 PROCEDURE — 93350 STRESS TTE ONLY: CPT | Mod: TC | Performed by: INTERNAL MEDICINE

## 2024-03-06 PROCEDURE — 93016 CV STRESS TEST SUPVJ ONLY: CPT | Performed by: INTERNAL MEDICINE

## 2024-03-06 PROCEDURE — 93350 STRESS TTE ONLY: CPT | Mod: 26 | Performed by: INTERNAL MEDICINE

## 2024-03-06 PROCEDURE — 93325 DOPPLER ECHO COLOR FLOW MAPG: CPT | Mod: 26 | Performed by: INTERNAL MEDICINE

## 2024-03-06 PROCEDURE — 93017 CV STRESS TEST TRACING ONLY: CPT | Performed by: INTERNAL MEDICINE

## 2024-03-06 PROCEDURE — 93321 DOPPLER ECHO F-UP/LMTD STD: CPT | Mod: 26 | Performed by: INTERNAL MEDICINE

## 2024-03-06 PROCEDURE — 93018 CV STRESS TEST I&R ONLY: CPT | Performed by: INTERNAL MEDICINE

## 2024-03-06 PROCEDURE — 93321 DOPPLER ECHO F-UP/LMTD STD: CPT | Mod: TC | Performed by: INTERNAL MEDICINE

## 2024-03-06 PROCEDURE — 93352 ADMIN ECG CONTRAST AGENT: CPT | Performed by: INTERNAL MEDICINE

## 2024-03-06 PROCEDURE — 93325 DOPPLER ECHO COLOR FLOW MAPG: CPT | Mod: TC | Performed by: INTERNAL MEDICINE

## 2024-03-06 RX ADMIN — Medication 3 ML: at 08:56

## 2024-03-06 NOTE — TELEPHONE ENCOUNTER
Reason for Call:   call back    Detailed comments: wants to talk about his stress test    Phone Number Patient can be reached at: Cell number on file:    Telephone Information:   Mobile [phone]       Best Time: anytime    Can we leave a detailed message on this number? YES    Call taken on 3/6/2024 at 4:43 PM by JOSEE STEPHENSON

## 2024-03-06 NOTE — TELEPHONE ENCOUNTER
Called pt back regard his stress test to see what questions he had as he saw Dr. Lee's note on MyChart. Provided number for pt to call is he does not hear from cardiology.    Routing to provider -  Pt says he thought his Echo looked good until he saw your message. Pt asking what about his stress test is mildly abnormal? Please advise.    Thank you - Marlen Rivera, NAKIAN, RN

## 2024-03-07 NOTE — TELEPHONE ENCOUNTER
Pt notified via phone of provider message below as written. Pt indicates understanding of issues and agrees with the plan. Pt confirms he has no sx and had wanted the testing because of the family hx that his older brother just required a triple bypass surgery. Pt states at this time he will no pursue further Cardiology workup while asymptomatic.     José eLe MD  3/7/2024  9:59 AM CST       Please call patient. The ultrasound part of the stress test was ok but the EKG part had some abnormalities. Usually the ultrasound part if more sensitive. If patient not having and symptoms of chest pain or shortness of breath with activity we can hold off seeing cardiology at this point or if wants a second opinion on heart can see cardiology. .     Patient:  Harvey Guevara  [phone] (home)     Provider:  José Lee MD MD  Please call/evisit with questions or concerns.     NAKIA SuN, RN

## 2024-04-01 DIAGNOSIS — J30.9 ALLERGIC RHINITIS, UNSPECIFIED SEASONALITY, UNSPECIFIED TRIGGER: ICD-10-CM

## 2024-04-01 RX ORDER — LORATADINE 10 MG/1
1 TABLET ORAL DAILY PRN
Qty: 90 TABLET | Refills: 3 | Status: SHIPPED | OUTPATIENT
Start: 2024-04-01

## 2025-01-01 DIAGNOSIS — E78.5 HYPERLIPIDEMIA WITH TARGET LDL LESS THAN 130: ICD-10-CM

## 2025-01-02 RX ORDER — SIMVASTATIN 40 MG
40 TABLET ORAL AT BEDTIME
Qty: 90 TABLET | Refills: 0 | Status: SHIPPED | OUTPATIENT
Start: 2025-01-02

## 2025-01-13 ENCOUNTER — DOCUMENTATION ONLY (OUTPATIENT)
Dept: FAMILY MEDICINE | Facility: CLINIC | Age: 75
End: 2025-01-13
Payer: COMMERCIAL

## 2025-01-13 DIAGNOSIS — E78.5 HYPERLIPIDEMIA WITH TARGET LDL LESS THAN 130: Primary | ICD-10-CM

## 2025-01-13 DIAGNOSIS — I10 HYPERTENSION GOAL BP (BLOOD PRESSURE) < 150/90: ICD-10-CM

## 2025-01-13 NOTE — PROGRESS NOTES
Harvey MITCHELL Brittneeanupama has an upcoming lab appointment:    Future Appointments   Date Time Provider Department Center   1/22/2025  7:30 AM AN LAB [facility]   1/30/2025  9:00 AM José Lee MD [facility]   2/20/2025  8:30 AM Chery Olson OD [facility]         There is no order available. Please review and place either future orders or HMPO (Review of Health Maintenance Protocol Orders), as appropriate.    Health Maintenance Due   Topic    ANNUAL REVIEW OF HM ORDERS     BMP     LIPID      Heath CALDWELLT

## 2025-01-22 ENCOUNTER — LAB (OUTPATIENT)
Dept: LAB | Facility: CLINIC | Age: 75
End: 2025-01-22
Payer: COMMERCIAL

## 2025-01-22 DIAGNOSIS — I10 HYPERTENSION GOAL BP (BLOOD PRESSURE) < 150/90: ICD-10-CM

## 2025-01-22 DIAGNOSIS — E78.5 HYPERLIPIDEMIA WITH TARGET LDL LESS THAN 130: ICD-10-CM

## 2025-01-22 LAB
ALBUMIN SERPL BCG-MCNC: 4.4 G/DL (ref 3.5–5.2)
ANION GAP SERPL CALCULATED.3IONS-SCNC: 10 MMOL/L (ref 7–15)
BUN SERPL-MCNC: 11.9 MG/DL (ref 8–23)
CALCIUM SERPL-MCNC: 9.5 MG/DL (ref 8.8–10.4)
CHLORIDE SERPL-SCNC: 92 MMOL/L (ref 98–107)
CHOLEST SERPL-MCNC: 182 MG/DL
CREAT SERPL-MCNC: 1 MG/DL (ref 0.67–1.17)
EGFRCR SERPLBLD CKD-EPI 2021: 78 ML/MIN/1.73M2
FASTING STATUS PATIENT QL REPORTED: YES
GLUCOSE SERPL-MCNC: 96 MG/DL (ref 70–99)
HCO3 SERPL-SCNC: 29 MMOL/L (ref 22–29)
HDLC SERPL-MCNC: 53 MG/DL
LDLC SERPL CALC-MCNC: 92 MG/DL
NONHDLC SERPL-MCNC: 129 MG/DL
PHOSPHATE SERPL-MCNC: 3.6 MG/DL (ref 2.5–4.5)
POTASSIUM SERPL-SCNC: 3.7 MMOL/L (ref 3.4–5.3)
SODIUM SERPL-SCNC: 131 MMOL/L (ref 135–145)
TRIGL SERPL-MCNC: 183 MG/DL

## 2025-01-22 PROCEDURE — 36415 COLL VENOUS BLD VENIPUNCTURE: CPT

## 2025-01-22 PROCEDURE — 80069 RENAL FUNCTION PANEL: CPT

## 2025-01-22 PROCEDURE — 80061 LIPID PANEL: CPT

## 2025-01-25 SDOH — HEALTH STABILITY: PHYSICAL HEALTH: ON AVERAGE, HOW MANY MINUTES DO YOU ENGAGE IN EXERCISE AT THIS LEVEL?: 60 MIN

## 2025-01-25 SDOH — HEALTH STABILITY: PHYSICAL HEALTH: ON AVERAGE, HOW MANY DAYS PER WEEK DO YOU ENGAGE IN MODERATE TO STRENUOUS EXERCISE (LIKE A BRISK WALK)?: 4 DAYS

## 2025-01-25 ASSESSMENT — SOCIAL DETERMINANTS OF HEALTH (SDOH): HOW OFTEN DO YOU GET TOGETHER WITH FRIENDS OR RELATIVES?: ONCE A WEEK

## 2025-01-30 ENCOUNTER — OFFICE VISIT (OUTPATIENT)
Dept: FAMILY MEDICINE | Facility: CLINIC | Age: 75
End: 2025-01-30
Payer: COMMERCIAL

## 2025-01-30 VITALS
BODY MASS INDEX: 27.09 KG/M2 | RESPIRATION RATE: 16 BRPM | SYSTOLIC BLOOD PRESSURE: 130 MMHG | HEIGHT: 72 IN | WEIGHT: 200 LBS | TEMPERATURE: 96.5 F | OXYGEN SATURATION: 99 % | HEART RATE: 71 BPM | DIASTOLIC BLOOD PRESSURE: 72 MMHG

## 2025-01-30 DIAGNOSIS — Z00.00 ENCOUNTER FOR MEDICARE ANNUAL WELLNESS EXAM: Primary | ICD-10-CM

## 2025-01-30 DIAGNOSIS — E78.5 HYPERLIPIDEMIA WITH TARGET LDL LESS THAN 130: ICD-10-CM

## 2025-01-30 DIAGNOSIS — L40.9 PSORIASIS: ICD-10-CM

## 2025-01-30 DIAGNOSIS — I10 HYPERTENSION GOAL BP (BLOOD PRESSURE) < 150/90: ICD-10-CM

## 2025-01-30 DIAGNOSIS — B00.1 RECURRENT COLD SORES: ICD-10-CM

## 2025-01-30 RX ORDER — TRIAMCINOLONE ACETONIDE 5 MG/G
CREAM TOPICAL
Qty: 90 G | Refills: 1 | Status: SHIPPED | OUTPATIENT
Start: 2025-01-30

## 2025-01-30 RX ORDER — VALACYCLOVIR HYDROCHLORIDE 1 G/1
TABLET, FILM COATED ORAL
Qty: 60 TABLET | Refills: 2 | Status: SHIPPED | OUTPATIENT
Start: 2025-01-30

## 2025-01-30 RX ORDER — SIMVASTATIN 40 MG
40 TABLET ORAL AT BEDTIME
Qty: 90 TABLET | Refills: 3 | Status: SHIPPED | OUTPATIENT
Start: 2025-01-30

## 2025-01-30 RX ORDER — LISINOPRIL AND HYDROCHLOROTHIAZIDE 20; 25 MG/1; MG/1
TABLET ORAL
Qty: 180 TABLET | Refills: 3 | Status: SHIPPED | OUTPATIENT
Start: 2025-01-30

## 2025-01-30 ASSESSMENT — PAIN SCALES - GENERAL: PAINLEVEL_OUTOF10: NO PAIN (0)

## 2025-01-30 NOTE — PATIENT INSTRUCTIONS
Patient Education   Preventive Care Advice   This is general advice given by our system to help you stay healthy. However, your care team may have specific advice just for you. Please talk to your care team about your preventive care needs.  Nutrition  Eat 5 or more servings of fruits and vegetables each day.  Try wheat bread, brown rice and whole grain pasta (instead of white bread, rice, and pasta).  Get enough calcium and vitamin D. Check the label on foods and aim for 100% of the RDA (recommended daily allowance).  Lifestyle  Exercise at least 150 minutes each week  (30 minutes a day, 5 days a week).  Do muscle strengthening activities 2 days a week. These help control your weight and prevent disease.  No smoking.  Wear sunscreen to prevent skin cancer.  Have a dental exam and cleaning every 6 months.  Yearly exams  See your health care team every year to talk about:  Any changes in your health.  Any medicines your care team has prescribed.  Preventive care, family planning, and ways to prevent chronic diseases.  Shots (vaccines)   HPV shots (up to age 26), if you've never had them before.  Hepatitis B shots (up to age 59), if you've never had them before.  COVID-19 shot: Get this shot when it's due.  Flu shot: Get a flu shot every year.  Tetanus shot: Get a tetanus shot every 10 years.  Pneumococcal, hepatitis A, and RSV shots: Ask your care team if you need these based on your risk.  Shingles shot (for age 50 and up)  General health tests  Diabetes screening:  Starting at age 35, Get screened for diabetes at least every 3 years.  If you are younger than age 35, ask your care team if you should be screened for diabetes.  Cholesterol test: At age 39, start having a cholesterol test every 5 years, or more often if advised.  Bone density scan (DEXA): At age 50, ask your care team if you should have this scan for osteoporosis (brittle bones).  Hepatitis C: Get tested at least once in your life.  STIs (sexually  transmitted infections)  Before age 24: Ask your care team if you should be screened for STIs.  After age 24: Get screened for STIs if you're at risk. You are at risk for STIs (including HIV) if:  You are sexually active with more than one person.  You don't use condoms every time.  You or a partner was diagnosed with a sexually transmitted infection.  If you are at risk for HIV, ask about PrEP medicine to prevent HIV.  Get tested for HIV at least once in your life, whether you are at risk for HIV or not.  Cancer screening tests  Cervical cancer screening: If you have a cervix, begin getting regular cervical cancer screening tests starting at age 21.  Breast cancer scan (mammogram): If you've ever had breasts, begin having regular mammograms starting at age 40. This is a scan to check for breast cancer.  Colon cancer screening: It is important to start screening for colon cancer at age 45.  Have a colonoscopy test every 10 years (or more often if you're at risk) Or, ask your provider about stool tests like a FIT test every year or Cologuard test every 3 years.  To learn more about your testing options, visit:   .  For help making a decision, visit:   https://bit.ly/gy74107.  Prostate cancer screening test: If you have a prostate, ask your care team if a prostate cancer screening test (PSA) at age 55 is right for you.  Lung cancer screening: If you are a current or former smoker ages 50 to 80, ask your care team if ongoing lung cancer screenings are right for you.  For informational purposes only. Not to replace the advice of your health care provider. Copyright   2023 [facility] [employer]. All rights reserved. Clinically reviewed by the [facility]itions Program. ERCOM 146158 - REV 01/24.  Hearing Loss: Care Instructions  Overview     Hearing loss is a sudden or slow decrease in how well you hear. It can range from slight to profound. Permanent hearing loss can occur with aging. It also can  happen when you are exposed long-term to loud noise. Examples include listening to loud music, riding motorcycles, or being around other loud machines.  Hearing loss can affect your work and home life. It can make you feel lonely or depressed. You may feel that you have lost your independence. But hearing aids and other devices can help you hear better and feel connected to others.  Follow-up care is a key part of your treatment and safety. Be sure to make and go to all appointments, and call your doctor if you are having problems. It's also a good idea to know your test results and keep a list of the medicines you take.  How can you care for yourself at home?  Avoid loud noises whenever possible. This helps keep your hearing from getting worse.  Always wear hearing protection around loud noises.  Wear a hearing aid as directed.  A professional can help you pick a hearing aid that will work best for you.  You can also get hearing aids over the counter for mild to moderate hearing loss.  Have hearing tests as your doctor suggests. They can show whether your hearing has changed. Your hearing aid may need to be adjusted.  Use other devices as needed. These may include:  Telephone amplifiers and hearing aids that can connect to a television, stereo, radio, or microphone.  Devices that use lights or vibrations. These alert you to the doorbell, a ringing telephone, or a baby monitor.  Television closed-captioning. This shows the words at the bottom of the screen. Most new TVs can do this.  TTY (text telephone). This lets you type messages back and forth on the telephone instead of talking or listening. These devices are also called TDD. When messages are typed on the keyboard, they are sent over the phone line to a receiving TTY. The message is shown on a monitor.  Use text messaging, social media, and email if it is hard for you to communicate by telephone.  Try to learn a listening technique called speechreading. It is  "not lipreading. You pay attention to people's gestures, expressions, posture, and tone of voice. These clues can help you understand what a person is saying. Face the person you are talking to, and have them face you. Make sure the lighting is good. You need to see the other person's face clearly.  Think about counseling if you need help to adjust to your hearing loss.  When should you call for help?  Watch closely for changes in your health, and be sure to contact your doctor if:    You think your hearing is getting worse.     You have new symptoms, such as dizziness or nausea.   Where can you learn more?  Go to https://www.[employer].net/patiented  Enter R798 in the search box to learn more about \"Hearing Loss: Care Instructions.\"  Current as of: September 27, 2023  Content Version: 14.3    2024 [employer].   Care instructions adapted under license by your healthcare professional. If you have questions about a medical condition or this instruction, always ask your healthcare professional. [employer] disclaims any warranty or liability for your use of this information.       "

## 2025-01-30 NOTE — PROGRESS NOTES
Preventive Care Visit  [facility]  José Lee MD, Family Medicine  Jan 30, 2025      Assessment & Plan     ASSESSMENT / PLAN:  (Z00.00) Encounter for Medicare annual wellness exam  (primary encounter diagnosis)  Comment: generally healthy and normal exam/labs. No falls/memory ok and good support  Plan: Reviewed self mole/testicle check handout.  Continue vitaminD.   Follow-up eye exam.      (E78.5) Hyperlipidemia with target LDL less than 130  Comment: stable  Plan: simvastatin (ZOCOR) 40 MG tablet        Lower carbs. If chest pain or shortness of breath to er.    (I10) Hypertension goal BP (blood pressure) < 150/90  Comment: stable  Plan: lisinopril-hydrochlorothiazide (ZESTORETIC)         20-25 MG tablet        Continue meds and self-monitor/exercise    (L40.9) Psoriasis  Comment: stable  Plan: triamcinolone (ARISTOCORT HP) 0.5 % external         cream        Prn. Derm if low worse    (B00.1) Recurrent cold sores  Comment: stable  Plan: valACYclovir (VALTREX) 1000 mg tablet        Prn.       Patient has been advised of split billing requirements and indicates understanding: Yes        BMI  Estimated body mass index is 27.12 kg/m  as calculated from the following:    Height as of this encounter: 1.829 m (6').    Weight as of this encounter: 90.7 kg (200 lb).       Counseling  Appropriate preventive services were addressed with this patient via screening, questionnaire, or discussion as appropriate for fall prevention, nutrition, physical activity, Tobacco-use cessation, social engagement, weight loss and cognition.  Checklist reviewing preventive services available has been given to the patient.  Reviewed patient's diet, addressing concerns and/or questions.   The patient was provided with written information regarding signs of hearing loss.           Jes Gabriel is a 74 year old, presenting for the following:  Wellness Visit  History htn, high cholesterol, psoriasis, ALLERGIC  RHINITIS, cold sores and ed.  coloonoscopy 2018 normal.  Outside blood pressure ok. Lots of ice fishing- [employer].   No chest pain or shortness of breath. No nausea, vomiting or diarrhea or blackbloody stools. No urine changes or hematuriia. No testicle pain/masses/hernia.   and home.  Kids doing ok. Seeing a lot and grandkids x2 -26,22. One greatgrand daughter  No rashes/moles changes. VitaminD.   Psoriasis stable.  Memory doing ok. No falls.   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   Outside blood pressure readings 128/75.   2 older brothers with cad. No cardiac issues.      1/30/2025     8:29 AM   Additional Questions   Roomed by Malini   Accompanied by self         1/30/2025     8:29 AM   Patient Reported Additional Medications   Patient reports taking the following new medications n/a           HPI          Health Care Directive  Patient does not have a Health Care Directive: Discussed advance care planning with patient; however, patient declined at this time.      1/25/2025   General Health   How would you rate your overall physical health? Good   Feel stress (tense, anxious, or unable to sleep) Not at all         1/25/2025   Nutrition   Diet: Regular (no restrictions)         1/25/2025   Exercise   Days per week of moderate/strenous exercise 4 days   Average minutes spent exercising at this level 60 min         1/25/2025   Social Factors   Frequency of gathering with friends or relatives Once a week   Worry food won't last until get money to buy more No   Food not last or not have enough money for food? No   Do you have housing? (Housing is defined as stable permanent housing and does not include staying ouside in a car, in a tent, in an abandoned building, in an overnight shelter, or couch-surfing.) Yes   Are you worried about losing your housing? No   Lack of transportation? No   Unable to get utilities (heat,electricity)? No         1/25/2025   Fall Risk   Fallen 2 or more times in the past year?  No    No   Trouble with walking or balance? No    No       Multiple values from one day are sorted in reverse-chronological order          1/25/2025   Activities of Daily Living- Home Safety   Needs help with the following daily activites None of the above   Safety concerns in the home None of the above         1/25/2025   Dental   Dentist two times every year? Yes         1/25/2025   Hearing Screening   Hearing concerns? (!) TROUBLE UNDERSTANDING SOFT OR WHISPERED SPEECH.         1/25/2025   Driving Risk Screening   Patient/family members have concerns about driving No         1/25/2025   General Alertness/Fatigue Screening   Have you been more tired than usual lately? No         1/25/2025   Urinary Incontinence Screening   Bothered by leaking urine in past 6 months No         1/25/2025   TB Screening   Were you born outside of the [place]? No         Today's PHQ-2 Score:       1/30/2025     8:30 AM   PHQ-2 ( 1999 Pf[employer])   Q1: Little interest or pleasure in doing things 0   Q2: Feeling down, depressed or hopeless 0   PHQ-2 Score 0           1/25/2025   Substance Use   Alcohol more than 3/day or more than 7/wk No   Do you have a current opioid prescription? No   How severe/bad is pain from 1 to 10? 3/10   Do you use any other substances recreationally? No     Social History     Tobacco Use    Smoking status: Former    Smokeless tobacco: Never    Tobacco comments:     Lives in smoke free household   Vaping Use    Vaping status: Never Used   Substance Use Topics    Alcohol use: Yes     Alcohol/week: 0.0 standard drinks of alcohol     Comment: 1-2 beers at night     Drug use: No           1/25/2025   AAA Screening   Family history of Abdominal Aortic Aneurysm (AAA)? No   ASCVD Risk   The 10-year ASCVD risk score ([place] MONIQUE, et al., 2019) is: 27.8%    Values used to calculate the score:      Age: 74 years      Sex: Male      Is Non- : No      Diabetic: No      Tobacco smoker: No       Systolic Blood Pressure: 130 mmHg      Is BP treated: Yes      HDL Cholesterol: 53 mg/dL      Total Cholesterol: 182 mg/dL            Reviewed and updated as needed this visit by Provider                      Current providers sharing in care for this patient include:  Patient Care Team:  José Lee MD as PCP - General (Family Practice)  José Lee MD as Assigned PCP  Chery Olson OD (Optometry)  Chery Olson OD as Assigned Surgical Provider    The following health maintenance items are reviewed in Epic and correct as of today:  Health Maintenance   Topic Date Due    ANNUAL REVIEW OF HM ORDERS  Never done    LUNG CANCER SCREENING  Never done    RSV VACCINE (1 - 1-dose 75+ series) Never done    EYE EXAM  10/10/2024    BMP  01/22/2026    LIPID  01/22/2026    MEDICARE ANNUAL WELLNESS VISIT  01/30/2026    FALL RISK ASSESSMENT  01/30/2026    GLUCOSE  01/22/2028    COLORECTAL CANCER SCREENING  03/14/2028    DTAP/TDAP/TD IMMUNIZATION (3 - Td or Tdap) 07/16/2028    ADVANCE CARE PLANNING  01/25/2029    HEPATITIS C SCREENING  Completed    PHQ-2 (once per calendar year)  Completed    INFLUENZA VACCINE  Completed    Pneumococcal Vaccine: 50+ Years  Completed    ZOSTER IMMUNIZATION  Completed    AORTIC ANEURYSM SCREENING (SYSTEM ASSIGNED)  Completed    COVID-19 Vaccine  Completed    HPV IMMUNIZATION  Aged Out    MENINGITIS IMMUNIZATION  Aged Out    RSV MONOCLONAL ANTIBODY  Aged Out            Objective    Exam  /72   Pulse 71   Temp (!) 96.5  F (35.8  C) (Tympanic)   Resp 16   Ht 1.829 m (6')   Wt 90.7 kg (200 lb)   SpO2 99%   BMI 27.12 kg/m     Estimated body mass index is 27.12 kg/m  as calculated from the following:    Height as of this encounter: 1.829 m (6').    Weight as of this encounter: 90.7 kg (200 lb).    Physical Exam  GENERAL: alert and no distress  EYES: Eyes grossly normal to inspection, PERRL and conjunctivae and sclerae normal  HENT: ear canals and TM's normal, nose and mouth  without ulcers or lesions  NECK: no adenopathy, no asymmetry, masses, or scars  RESP: lungs clear to auscultation - no rales, rhonchi or wheezes  BREAST: normal without masses, tenderness or nipple discharge and no palpable axillary masses or adenopathy  CV: regular rate and rhythm, normal S1 S2, no S3 or S4, no murmur, click or rub, no peripheral edema   ABDOMEN: soft, nontender, no hepatosplenomegaly, no masses and bowel sounds normal  MS: no gross musculoskeletal defects noted, no edema  SKIN: no suspicious lesions or rashes  NEURO: Normal strength and tone, mentation intact and speech normal  PSYCH: mentation appears normal, affect normal/bright        1/30/2025   Mini Cog   Clock Draw Score 2 Normal   3 Item Recall 3 objects recalled   Mini Cog Total Score 5              Signed Electronically by: José Lee MD

## 2025-02-18 ENCOUNTER — DOCUMENTATION ONLY (OUTPATIENT)
Dept: OTHER | Facility: CLINIC | Age: 75
End: 2025-02-18
Payer: COMMERCIAL

## 2025-02-20 ENCOUNTER — OFFICE VISIT (OUTPATIENT)
Dept: OPTOMETRY | Facility: CLINIC | Age: 75
End: 2025-02-20
Payer: COMMERCIAL

## 2025-02-20 DIAGNOSIS — H25.13 NUCLEAR SCLEROTIC CATARACT OF BOTH EYES: ICD-10-CM

## 2025-02-20 DIAGNOSIS — H52.223 REGULAR ASTIGMATISM OF BOTH EYES: ICD-10-CM

## 2025-02-20 DIAGNOSIS — H02.831 DERMATOCHALASIS OF BOTH UPPER EYELIDS: ICD-10-CM

## 2025-02-20 DIAGNOSIS — H02.834 DERMATOCHALASIS OF BOTH UPPER EYELIDS: ICD-10-CM

## 2025-02-20 DIAGNOSIS — H52.4 PRESBYOPIA: ICD-10-CM

## 2025-02-20 DIAGNOSIS — Z01.01 VISION EXAM WITH ABNORMAL FINDINGS: Primary | ICD-10-CM

## 2025-02-20 ASSESSMENT — TONOMETRY
OS_IOP_MMHG: 15
OD_IOP_MMHG: 15
IOP_METHOD: APPLANATION

## 2025-02-20 ASSESSMENT — VISUAL ACUITY
OD_SC: 20/200
OS_CC: 20/30-1
OS_SC: 20/100-1
OS_SC: 20/30
OS_SC+: -1
OD_SC: 20/30
OD_CC: 20/40
METHOD: SNELLEN - LINEAR
CORRECTION_TYPE: GLASSES

## 2025-02-20 ASSESSMENT — REFRACTION_MANIFEST
OS_SPHERE: +0.25
OD_CYLINDER: +1.50
METHOD_AUTOREFRACTION: 1
OD_CYLINDER: +0.50
OS_CYLINDER: +1.00
OS_ADD: +2.25
OD_AXIS: 180
OD_SPHERE: +0.25
OS_AXIS: 163
OS_AXIS: 170
OD_ADD: +2.25
OD_AXIS: 172
OD_SPHERE: -0.25
OS_CYLINDER: +1.25
OS_SPHERE: PLANO

## 2025-02-20 ASSESSMENT — CONF VISUAL FIELD
OD_NORMAL: 1
OS_SUPERIOR_NASAL_RESTRICTION: 0
OS_INFERIOR_TEMPORAL_RESTRICTION: 0
OD_INFERIOR_NASAL_RESTRICTION: 0
OD_SUPERIOR_TEMPORAL_RESTRICTION: 0
OD_INFERIOR_TEMPORAL_RESTRICTION: 0
OS_SUPERIOR_TEMPORAL_RESTRICTION: 0
METHOD: COUNTING FINGERS
OS_INFERIOR_NASAL_RESTRICTION: 0
OS_NORMAL: 1
OD_SUPERIOR_NASAL_RESTRICTION: 0

## 2025-02-20 ASSESSMENT — SLIT LAMP EXAM - LIDS
COMMENTS: 2+ DERMATOCHALASIS: UPPER LID
COMMENTS: 2+ DERMATOCHALASIS: UPPER LID

## 2025-02-20 ASSESSMENT — KERATOMETRY
OD_K1POWER_DIOPTERS: 42.75
OS_AXISANGLE2_DEGREES: 174
OD_K2POWER_DIOPTERS: 42.00
OS_K1POWER_DIOPTERS: 42.00
OS_K2POWER_DIOPTERS: 42.50
OD_AXISANGLE2_DEGREES: 7

## 2025-02-20 ASSESSMENT — REFRACTION_WEARINGRX
OS_SPHERE: +2.50
OD_SPHERE: +2.50
SPECS_TYPE: OTC'S

## 2025-02-20 ASSESSMENT — CUP TO DISC RATIO
OS_RATIO: 0.3
OD_RATIO: 0.3

## 2025-02-20 NOTE — PATIENT INSTRUCTIONS
Fill glasses prescription  Allow 2 weeks to adapt to change in glasses  Return in 1 year for eye exam    Chery Olson O.D.       [facility]  [address] [place] [address] [phone]

## 2025-02-20 NOTE — PROGRESS NOTES
"Chief Complaint   Patient presents with    COMPREHENSIVE EYE EXAM         Last Eye Exam: 10/10/23  Dilated Previously: Yes, wants the \"weaker drops\"     What are you currently using to see?  readers       Distance Vision Acuity: Satisfied with vision    Near Vision Acuity: Satisfied with vision while reading and using computer with readers, not always needed on the computer     Eye Comfort: good  Do you use eye drops? : Not on a regular basis. 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       OBJECTIVE: See Ophthalmology exam    ASSESSMENT:    ICD-10-CM    1. Vision exam with abnormal findings  Z01.01 EYE EXAM (SIMPLE-NONBILLABLE)     REFRACTION      2. Regular astigmatism of both eyes  H52.223 EYE EXAM (SIMPLE-NONBILLABLE)     REFRACTION      3. Presbyopia  H52.4 EYE EXAM (SIMPLE-NONBILLABLE)     REFRACTION      4. Nuclear sclerotic cataract of both eyes  H25.13 EYE EXAM (SIMPLE-NONBILLABLE)     REFRACTION      5. Dermatochalasis of both upper eyelids  H02.831     H02.834           PLAN:     Patient Instructions   Fill glasses prescription  Allow 2 weeks to adapt to change in glasses  Return in 1 year for eye exam    Chery Olson O.D.       [facility]  [address] [place], [place] [id number] [phone]      "

## 2025-02-20 NOTE — LETTER
"2/20/2025      Harvey Guevara  [address] [facility] [address]      Dear Colleague,    Thank you for referring your patient, Harvey Guevara, to the [facility]. Please see a copy of my visit note below.    Chief Complaint   Patient presents with     COMPREHENSIVE EYE EXAM         Last Eye Exam: 10/10/23  Dilated Previously: Yes, wants the \"weaker drops\"     What are you currently using to see?  readers       Distance Vision Acuity: Satisfied with vision    Near Vision Acuity: Satisfied with vision while reading and using computer with readers, not always needed on the computer     Eye Comfort: good  Do you use eye drops? : Not on a regular basis. 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       OBJECTIVE: See Ophthalmology exam    ASSESSMENT:    ICD-10-CM    1. Vision exam with abnormal findings  Z01.01 EYE EXAM (SIMPLE-NONBILLABLE)     REFRACTION      2. Regular astigmatism of both eyes  H52.223 EYE EXAM (SIMPLE-NONBILLABLE)     REFRACTION      3. Presbyopia  H52.4 EYE EXAM (SIMPLE-NONBILLABLE)     REFRACTION      4. Nuclear sclerotic cataract of both eyes  H25.13 EYE EXAM (SIMPLE-NONBILLABLE)     REFRACTION      5. Dermatochalasis of both upper eyelids  H02.831     H02.834           PLAN:     Patient Instructions   Fill glasses prescription  Allow 2 weeks to adapt to change in glasses  Return in 1 year for eye exam    Chery Olson O.D.       [facility]  [address] [place] [address]  [phone]        Again, thank you for allowing me to participate in the care of your patient.        Sincerely,        Chery Olson, ELSA    Electronically signed"

## 2025-07-10 ENCOUNTER — OFFICE VISIT (OUTPATIENT)
Dept: OPTOMETRY | Facility: CLINIC | Age: 75
End: 2025-07-10
Payer: COMMERCIAL

## 2025-07-10 DIAGNOSIS — H00.011 HORDEOLUM EXTERNUM OF RIGHT UPPER EYELID: Primary | ICD-10-CM

## 2025-07-10 ASSESSMENT — VISUAL ACUITY
METHOD: SNELLEN - LINEAR
OD_SC+: -2
OS_SC: 20/25
OD_SC: 20/25
OS_SC+: -2

## 2025-07-10 ASSESSMENT — SLIT LAMP EXAM - LIDS: COMMENTS: NORMAL

## 2025-07-10 NOTE — PROGRESS NOTES
Chief Complaint   Patient presents with    Eye Problem Right Eye     Patient said that a few days ago he noticed his eye was itching and then when he looked carefully and there was a bump. He said that it seems to be getting bigger. He wanted to know if there was a medication that he could take to make it go away. He denies pain, it's just bothersome and itches some. Also mentioned that today his left eye seems to be itching a little.      Will be leaving soon for motorcycle vacation wants stye to resolve quickly    Chief Complaint(s) and History of Present Illness(es)       Eye Problem Right Eye              Laterality: right eye    Onset: unknown    Onset: 5 days ago    Frequency: constantly    Course: stable    Associated symptoms: itching and swelling    Treatments tried: eye drops and warm compresses    Response to treatment: no improvement    Pain scale: 0/10    Comments: Patient said that a few days ago he noticed his eye was itching and then when he looked carefully and there was a bump. He said that it seems to be getting bigger. He wanted to know if there was a medication that he could take to make it go away. He denies pain, it's just bothersome and itches some. Also mentioned that today his left eye seems to be itching a little. He said that he has used a hot pack several times                        Negra Apple Optometric Assistant      Review of Symptoms    EYES: See HPI  CONSTITUTIONAL: patient reports feeling healthy        Medical, surgical and family histories reviewed and updated 7/10/2025.         OBJECTIVE: See Ophthalmology exam    ASSESSMENT:    ICD-10-CM    1. Hordeolum externum of right upper eyelid  H00.011 amoxicillin-clavulanate (AUGMENTIN) 875-125 MG tablet         PLAN:    Patient Instructions   Take all of oral antibiotic as directed  Continue warm compresses 15 minutes 3 times a day  Return to clinic as needed     Chery Olson O.D.   Electronically Signed    [facility]  Optometry  [address] [place], [place] [id number] [phone]

## 2025-07-10 NOTE — LETTER
7/10/2025      Harvey Guevara  [address]  [place] [address]      Dear Colleague,    Thank you for referring your patient, Harvey Guevara, to the [facility]. Please see a copy of my visit note below.    Chief Complaint   Patient presents with     Eye Problem Right Eye     Patient said that a few days ago he noticed his eye was itching and then when he looked carefully and there was a bump. He said that it seems to be getting bigger. He wanted to know if there was a medication that he could take to make it go away. He denies pain, it's just bothersome and itches some. Also mentioned that today his left eye seems to be itching a little.      Will be leaving soon for motorcycle vacation wants stye to resolve quickly    Chief Complaint(s) and History of Present Illness(es)       Eye Problem Right Eye              Laterality: right eye    Onset: unknown    Onset: 5 days ago    Frequency: constantly    Course: stable    Associated symptoms: itching and swelling    Treatments tried: eye drops and warm compresses    Response to treatment: no improvement    Pain scale: 0/10    Comments: Patient said that a few days ago he noticed his eye was itching and then when he looked carefully and there was a bump. He said that it seems to be getting bigger. He wanted to know if there was a medication that he could take to make it go away. He denies pain, it's just bothersome and itches some. Also mentioned that today his left eye seems to be itching a little. He said that he has used a hot pack several times                        Negra Apple Optometric Assistant      Review of Symptoms    EYES: See HPI  CONSTITUTIONAL: patient reports feeling healthy        Medical, surgical and family histories reviewed and updated 7/10/2025.         OBJECTIVE: See Ophthalmology exam    ASSESSMENT:    ICD-10-CM    1. Hordeolum externum of right upper eyelid  H00.011 amoxicillin-clavulanate (AUGMENTIN) 875-125 MG  tablet         PLAN:    Patient Instructions   Take all of oral antibiotic as directed  Continue warm compresses 15 minutes 3 times a day  Return to clinic as needed     Chery Olson O.D.   Electronically Signed    [facility]  [address] [place] [address] [phone]          Again, thank you for allowing me to participate in the care of your patient.        Sincerely,        Chery Olson OD    Electronically signed